# Patient Record
Sex: MALE | Race: WHITE
[De-identification: names, ages, dates, MRNs, and addresses within clinical notes are randomized per-mention and may not be internally consistent; named-entity substitution may affect disease eponyms.]

---

## 2018-10-15 ENCOUNTER — HOSPITAL ENCOUNTER (INPATIENT)
Dept: HOSPITAL 74 - JSAMEDAYSX | Age: 73
LOS: 7 days | Discharge: NURSING, REHAB OR LONG-TERM CARE | DRG: 460 | End: 2018-10-22
Attending: INTERNAL MEDICINE | Admitting: ORTHOPAEDIC SURGERY
Payer: COMMERCIAL

## 2018-10-15 VITALS — BODY MASS INDEX: 26.9 KG/M2

## 2018-10-15 DIAGNOSIS — K59.00: ICD-10-CM

## 2018-10-15 DIAGNOSIS — E78.1: ICD-10-CM

## 2018-10-15 DIAGNOSIS — M53.2X5: ICD-10-CM

## 2018-10-15 DIAGNOSIS — E83.42: ICD-10-CM

## 2018-10-15 DIAGNOSIS — M48.061: Primary | ICD-10-CM

## 2018-10-15 DIAGNOSIS — E78.5: ICD-10-CM

## 2018-10-15 DIAGNOSIS — M54.9: ICD-10-CM

## 2018-10-15 DIAGNOSIS — K21.9: ICD-10-CM

## 2018-10-15 DIAGNOSIS — D62: ICD-10-CM

## 2018-10-15 DIAGNOSIS — E83.39: ICD-10-CM

## 2018-10-15 DIAGNOSIS — F03.90: ICD-10-CM

## 2018-10-15 DIAGNOSIS — M41.86: ICD-10-CM

## 2018-10-15 PROCEDURE — 0SG30AJ FUSION OF LUMBOSACRAL JOINT WITH INTERBODY FUSION DEVICE, POSTERIOR APPROACH, ANTERIOR COLUMN, OPEN APPROACH: ICD-10-PCS | Performed by: NEUROLOGICAL SURGERY

## 2018-10-15 PROCEDURE — 0SP30JZ REMOVAL OF SYNTHETIC SUBSTITUTE FROM LUMBOSACRAL JOINT, OPEN APPROACH: ICD-10-PCS | Performed by: NEUROLOGICAL SURGERY

## 2018-10-15 PROCEDURE — 0QB30ZZ EXCISION OF LEFT PELVIC BONE, OPEN APPROACH: ICD-10-PCS | Performed by: NEUROLOGICAL SURGERY

## 2018-10-15 PROCEDURE — 4A1004G MONITORING OF CENTRAL NERVOUS ELECTRICAL ACTIVITY, INTRAOPERATIVE, OPEN APPROACH: ICD-10-PCS | Performed by: NEUROLOGICAL SURGERY

## 2018-10-15 PROCEDURE — 0RG70AJ FUSION OF 2 TO 7 THORACIC VERTEBRAL JOINTS WITH INTERBODY FUSION DEVICE, POSTERIOR APPROACH, ANTERIOR COLUMN, OPEN APPROACH: ICD-10-PCS | Performed by: ORTHOPAEDIC SURGERY

## 2018-10-15 RX ADMIN — SODIUM CHLORIDE, POTASSIUM CHLORIDE, SODIUM LACTATE AND CALCIUM CHLORIDE SCH MLS/HR: 600; 310; 30; 20 INJECTION, SOLUTION INTRAVENOUS at 22:44

## 2018-10-15 RX ADMIN — ACETAMINOPHEN SCH MG: 10 INJECTION, SOLUTION INTRAVENOUS at 16:40

## 2018-10-15 RX ADMIN — SODIUM CHLORIDE, POTASSIUM CHLORIDE, SODIUM LACTATE AND CALCIUM CHLORIDE SCH MLS: 600; 310; 30; 20 INJECTION, SOLUTION INTRAVENOUS at 17:55

## 2018-10-15 RX ADMIN — HYDROMORPHONE HYDROCHLORIDE SCH MG: 10 INJECTION, SOLUTION INTRAMUSCULAR; INTRAVENOUS; SUBCUTANEOUS at 21:38

## 2018-10-15 NOTE — PN
Progress Note (short form)





- Note


Progress Note: 





73M s/p AJITH L3-S1; L2, L3, L4 laminectomies; T6-T12 facetectomies T6-T12; L1-L2

, L2-L3 SPO's T6-S1 PISF POD #0.





-Admit to ICU post-op.


-Pain control: per anaesthesia team; recommend PCA; No NSAID's.


-DVT PPx:


   - Mechanical only: BLANE's, SCD's.


-Incentive spirometry.


-PT/OT/Rehab, OOB.


-WBAT B/L LE.


-q4h B/L LE NV checks.


-Post-op antibiotics x 2 doses.


-NPO until flatus.


-f/u AM labs.


-f/u drain output.


-Care per ICU & medical hospitalist team.


-Discharge planning: f/u 7-10 days after discharge at [facility] [place] 

office; call for appointment; [phone].


-Will follow.





Rafa Andrews MD (Orthopaedic Surgery).

## 2018-10-15 NOTE — OP
Operative Note





- Note:


Operative Date: 10/15/18


Pre-Operative Diagnosis: 1. Thoracic kypho-scoliosis.  2. Adjacent level 

disease.  3. Segmental instability


Operation: 1. L2, L3, L4 laminectomies.  2. T6-T12 facetectomies.  3. L1-L2, L2-

L3 SPO's.  4. T6-S1 PISF


Post-Operative Diagnosis: Same as Pre-op


Surgeon: Rafa Andrews


Assistant: Josue Andrews


Anesthesiologist/CRNA: Jerry Barrientos


Anesthesia: General


Specimens Removed: Hardware, bone, soft tissue


Estimated Blood Loss (mls): 400


Drains & Tubes with Location: 1 x superficial HemoVac


Blood Volume Replaced (mls): 125 (Cell saver)


Fluid Volume Replaced (mls): 3,000 (Crystalloid)


Operative Report Dictated: Yes

## 2018-10-15 NOTE — CONSULT
Consultation: 


REQUESTING PROVIDER:





CONSULT REQUEST: We have been asked to medically evaluate this patient for (

specify).





HISTORY OF PRESENT ILLNESS:





This is a 72 yo M with PMH of GERD, anemia, spinal stenosis, here POD 0 s/p L2, 

L3, L4 laminectomies; T6-T12 facetectomies; L1-L2, L2-L3 SPO's; T6-S1 PISF by 

Dr Andrews. Procedure was w/o complications; , received cell saver 125 cc 

and 3L crystalloid. Patient is currently aaox3, afebrile and hemodynamically 

stable but experiencing sever lumbar pain. He denies LE numbness or weakness. 

Denies sob, cp, cough, h/a, n/v. 





REVIEW OF SYSTEMS:


CONSTITUTIONAL: 


Absent:  fever, chills


HEENT: 


Absent:  rhinorrhea, nasal congestion, throat pain, throat swelling, difficulty 

swallowing


CARDIOVASCULAR: 


Absent: chest pain, syncope, palpitations, irregular heart rate, lightheadedness


RESPIRATORY: 


Absent: cough, shortness of breath


GASTROINTESTINAL:


Absent: abdominal pain, nausea, vomiting


GENITOURINARY: 


Absent: flank pain


MUSCULOSKELETAL: 


Absent: myalgia, arthralgia


SKIN: 


Absent: rash, itching, pallor


HEMATOLOGIC/IMMUNOLOGIC: 


Absent: easy bleeding, easy bruising


ENDOCRINE:


Absent: unexplained weight gain, unexplained weight loss


NEUROLOGIC: 


Absent: headache, focal weakness or paresthesias


PSYCHIATRIC: 


Absent: anxiety, depression





PHYSICAL EXAMINATION





 Vital Signs - 24 hr











  10/15/18 10/15/18 10/15/18





  07:03 16:01 16:15


 


Temperature 97.6 F 97.3 F L 


 


Pulse Rate 60 76 68


 


Respiratory 20 20 16





Rate   


 


Blood Pressure 136/76 116/97 120/60


 


O2 Sat by Pulse 100 100 100





Oximetry (%)   














  10/15/18 10/15/18 10/15/18





  16:30 16:45 17:00


 


Temperature   


 


Pulse Rate 70 58 L 66


 


Respiratory 16 16 16





Rate   


 


Blood Pressure 121/66 135/68 120/40 L


 


O2 Sat by Pulse 100 100 100





Oximetry (%)   














  10/15/18 10/15/18 10/15/18





  17:15 17:30 17:45


 


Temperature   


 


Pulse Rate 72 70 71


 


Respiratory 14 14 16





Rate   


 


Blood Pressure 133/56 L 134/73 143/66


 


O2 Sat by Pulse 100 100 100





Oximetry (%)   














  10/15/18 10/15/18





  18:00 18:21


 


Temperature 98.2 F 97.6 F


 


Pulse Rate 72 53 L


 


Respiratory 16 14





Rate  


 


Blood Pressure 142/68 152/79


 


O2 Sat by Pulse 100 100





Oximetry (%)  














GENERAL: Awake, alert, and fully oriented, in moderate distress due to pain 


HEAD: Normal with no signs of trauma.


EYES: Pupils equal, round and reactive to light, clera anicteric, conjunctiva 

clear. No lid lag.


EARS, NOSE, THROAT: Moist mucous membranes.


NECK: supple without lymphadenopathy, JVD, or masses.


LUNGS: Breath sounds equal, clear to auscultation bilaterally. 


HEART: Regular rate and rhythm, normal S1 and S2 


ABDOMEN: Soft, nontender, not distended, globally reduced bowel sounds, no 

guarding, no rebound, no masses. 


MUSCULOSKELETAL: No bony deformities or tenderness. 


UPPER EXTREMITIES: 2+ pulses, warm, well-perfused. No peripheral edema.


LOWER EXTREMITIES: 2+ pulses, warm, well-perfused. No calf tenderness. No 

peripheral edema. 


NEUROLOGICAL:  Cranial nerves II-XII gossly intact. Normal speech. Sensation 

intact in b/l LE, moves b/l LE but unable to test strenght due to sever back 

pain. 


PSYCHIATRIC: Cooperative. Good eye contact. Appropriate mood and affect.


SKIN: Warm, dry











 Laboratory Results - last 24 hr











  10/15/18 10/15/18





  06:22 06:55


 


Blood Type  A POSITIVE  A POSITIVE


 


Antibody Screen  Negative 








Active Medications











Generic Name Dose Route Start Last Admin





  Trade Name Freq  PRN Reason Stop Dose Admin


 


Acetaminophen  650 mg  10/15/18 16:05  





  Tylenol -  PO   





  Q4H PRN   





  PAIN LEVEL 4 - 6   





     





     





     


 


Acetaminophen  1,000 mg  10/15/18 16:15  10/15/18 16:40





  Ofirmev Injection -  IVPB  10/16/18 08:16  1,000 mg





  Q8H EVIE   Administration





     





     





     





     


 


Dicyclomine HCl  10 mg  10/16/18 10:00  





  Bentyl -  PO   





  DAILY EVIE   





     





     





     





     


 


Diphenhydramine HCl  25 mg  10/15/18 16:05  





  Benadryl Injection -  IVPUSH   





  Q4H PRN   





  Pruritis   





     





     





     


 


Donepezil HCl  5 mg  10/16/18 10:00  





  Aricept -  PO   





  DAILY EVIE   





     





     





     





     


 


Lactated Ringer's  1,000 mls @ 125 mls/hr  10/15/18 16:15  10/15/18 17:55





  Lactated Ringers Solution  IV   0 mls





  ASDIR EVIE   Administration





     





     





     





     


 


Non-Formulary Medication  1 gm  10/16/18 10:00  





  Icosapent Ethyl [Vascepa]  PO   





  DAILY Pending sale to [facility]   





     





     





     





     


 


Ondansetron HCl  4 mg  10/15/18 16:05  





  Zofran Injection  IVPUSH   





  Q4H PRN   





  NAUSEA   





     





     





     











ASSESSMENT/PLAN:


This is a 72 yo M with PMH of GERD, anemia, spinal stenosis





POD 0 s/p L2, L3, L4 laminectomies; T6-T12 facetectomies; L1-L2, L2-L3 SPO's; T6

-S1 PISF


-strict I/O, monitor drain output


-pain control with IV tylenol, Dilaudid pushes prn until anesthesia reassesses 

the patient and decides on further analgesia. 


-zofran PRN nausea


-LR @ 125


-SCD's


-incentive spirometry


-NPO until flatus 


-LE neuro checks q4h


-f/u h/h, lytes





Dispo: We will continue to follow the patient. Thank you for this consultative 

opportunity.











Problem List





- Problems


(1) Spinal stenosis


Code(s): M48.00 - SPINAL STENOSIS, SITE UNSPECIFIED   





(2) Spinal stenosis of lumbar region


Code(s): M48.061 - SPINAL STENOSIS, LUMBAR REGION WITHOUT NEUROGENIC JUN   





(3) Anemia


Code(s): D64.9 - ANEMIA, UNSPECIFIED   





(4) GERD (gastroesophageal reflux disease)


Code(s): K21.9 - GASTRO-ESOPHAGEAL REFLUX DISEASE WITHOUT ESOPHAGITIS   





(5) Back pain


Code(s): M54.9 - DORSALGIA, UNSPECIFIED   





Visit type





- Emergency Visit


Emergency Visit: No





- New Patient


This patient is new to me today: Yes


Date on this admission: 10/15/18





- Critical Care


Critical Care patient: Yes


Total Critical Care Time (in minutes): 35


Critical Care Statement: The care of this patient involved high complexity 

decision making to prevent further life threatening deterioration of the patient

's condition and/or to evaluate & treat vital organ system(s) failure or risk 

of failure.

## 2018-10-16 LAB
ANION GAP SERPL CALC-SCNC: 5 MMOL/L (ref 8–16)
BUN SERPL-MCNC: 15 MG/DL (ref 7–18)
CALCIUM SERPL-MCNC: 7.8 MG/DL (ref 8.5–10.1)
CHLORIDE SERPL-SCNC: 104 MMOL/L (ref 98–107)
CO2 SERPL-SCNC: 28 MMOL/L (ref 21–32)
CREAT SERPL-MCNC: 1 MG/DL (ref 0.55–1.3)
DEPRECATED RDW RBC AUTO: 12.6 % (ref 11.9–15.9)
GLUCOSE SERPL-MCNC: 130 MG/DL (ref 74–106)
HCT VFR BLD CALC: 30.4 % (ref 35.4–49)
HGB BLD-MCNC: 10.5 GM/DL (ref 11.7–16.9)
MAGNESIUM SERPL-MCNC: 1.7 MG/DL (ref 1.8–2.4)
MCH RBC QN AUTO: 31.4 PG (ref 25.7–33.7)
MCHC RBC AUTO-ENTMCNC: 34.5 G/DL (ref 32–35.9)
MCV RBC: 90.8 FL (ref 80–96)
PHOSPHATE SERPL-MCNC: 3.8 MG/DL (ref 2.5–4.9)
PLATELET # BLD AUTO: 155 K/MM3 (ref 134–434)
PMV BLD: 8.6 FL (ref 7.5–11.1)
POTASSIUM SERPLBLD-SCNC: 5 MMOL/L (ref 3.5–5.1)
RBC # BLD AUTO: 3.35 M/MM3 (ref 4–5.6)
SODIUM SERPL-SCNC: 138 MMOL/L (ref 136–145)
WBC # BLD AUTO: 10.5 K/MM3 (ref 4–10)

## 2018-10-16 RX ADMIN — ACETAMINOPHEN SCH MG: 10 INJECTION, SOLUTION INTRAVENOUS at 09:20

## 2018-10-16 RX ADMIN — ACETAMINOPHEN SCH MG: 10 INJECTION, SOLUTION INTRAVENOUS at 01:05

## 2018-10-16 RX ADMIN — SODIUM CHLORIDE, POTASSIUM CHLORIDE, SODIUM LACTATE AND CALCIUM CHLORIDE SCH MLS/HR: 600; 310; 30; 20 INJECTION, SOLUTION INTRAVENOUS at 17:46

## 2018-10-16 RX ADMIN — OMEGA-3-ACID ETHYL ESTERS SCH GM: 1 CAPSULE, LIQUID FILLED ORAL at 14:10

## 2018-10-16 RX ADMIN — SODIUM CHLORIDE, POTASSIUM CHLORIDE, SODIUM LACTATE AND CALCIUM CHLORIDE SCH MLS/HR: 600; 310; 30; 20 INJECTION, SOLUTION INTRAVENOUS at 06:00

## 2018-10-16 RX ADMIN — SODIUM CHLORIDE, POTASSIUM CHLORIDE, SODIUM LACTATE AND CALCIUM CHLORIDE SCH MLS/HR: 600; 310; 30; 20 INJECTION, SOLUTION INTRAVENOUS at 23:00

## 2018-10-16 RX ADMIN — DONEPEZIL HYDROCHLORIDE SCH: 5 TABLET, FILM COATED ORAL at 17:46

## 2018-10-16 NOTE — PN
Progress Note (short form)





- Note


Progress Note: 





POD #1 - s/p T6-S1 laminectomy revision/fusion under GA with dilaudid PCA for 

post-op pain management.


VSS. Overall, pt. doing well, resting in bed awaiting physical therapy. Pt. 

encouraged to continue to use


PCA as directed. No apparent anesthetic complications noted. Continue current 

care.

## 2018-10-16 NOTE — PN
Teaching Attending Note


Name of Resident: Josue Cortes





ATTENDING PHYSICIAN STATEMENT





I saw and evaluated the patient.


I reviewed the resident's note and discussed the case with the resident.


I agree with the resident's findings and plan as documented.








SUBJECTIVE:





Pt seen and examined in the ICU. Pain relatively controlled with current 

regimen. No flatus or BM yet. 





OBJECTIVE:





 Vital Signs











 Period  Temp  Pulse  Resp  BP Sys/Calloway  Pulse Ox


 


 Last 24 Hr  97.3 F-98.2 F  53-76  10-22  102-152/40-97  100-100








 Intake & Output











 10/13/18 10/14/18 10/15/18 10/16/18





 23:59 23:59 23:59 23:59


 


Intake Total   3650 1600


 


Output Total   720 1260


 


Balance   2930 340


 


Weight    79.605 kg








Gen:  NAD at rest


Heart: RRR


Lung: decreased breath sounds at the bases


Abd: soft, nontender


Ext: no edema


Drain with serosanguinous fluid





 CBC, BMP





 10/16/18 05:30 





 10/16/18 05:30 





Active Medications





Acetaminophen (Tylenol -)  650 mg PO Q4H PRN


   PRN Reason: PAIN LEVEL 4 - 6


Diphenhydramine HCl (Benadryl Injection -)  25 mg IVPUSH Q4H PRN


   PRN Reason: Pruritis


Donepezil HCl (Aricept -)  5 mg PO DAILY EVIE


Hydromorphone HCl (Dilaudid Pca -)  10 mg PCA PCA EVIE; Protocol


   Stop: 10/22/18 20:24


   Last Admin: 10/15/18 21:38 Dose:  10 mg


Lactated Ringer's (Lactated Ringers Solution)  1,000 mls @ 125 mls/hr IV ASDIR 

EVIE


   Last Admin: 10/16/18 06:00 Dose:  125 mls/hr


Magnesium Oxide (Mag-Ox -)  400 mg PO DAILY EVIE


Omega-3-Acid Ethyl Esters (Lovaza -)  2 gm PO DAILY EVIE


Ondansetron HCl (Zofran Injection)  4 mg IVPUSH Q4H PRN


   PRN Reason: NAUSEA


Ondansetron HCl (Zofran Injection)  4 mg IVPUSH Q4H PRN


   PRN Reason: NAUSEA AND/OR VOMITING


Promethazine HCl (Phenergan Injection -)  12.5 mg IVPB Q6H PRN


   PRN Reason: NAUSEA AND/OR VOMITING








ASSESSMENT AND PLAN:


Thoracic Kypho-scoliosis


s/p L2, L3, L4 laminectomies/T6-T12 Facetectomies/L1-L2, L2-L3 osteotomies/T6-

S1 PISF





-  pain control


-  incentive spirometry


-  monitor drain output


-  PO when flatus


-  d/c espinoza when OOB


-  DVT prophylaxis/disposition per surgery

## 2018-10-16 NOTE — PN
Physical Exam: 


SUBJECTIVE: Patient seen and examined at bedside


complains of lower back pain


denies having flatus or BM


Denies nausea vomiting fever chills chest pain or shortness of breath.








OBJECTIVE:





 Vital Signs











 Period  Temp  Pulse  Resp  BP Sys/Calloway  Pulse Ox


 


 Last 24 Hr  97.3 F-98.2 F  53-76  10-22  102-152/40-97  100-100











GENERAL: The patient is awake, alert, and fully oriented, in no acute distress.


HEAD: Normal with no signs of trauma.


EYES: PERRL, extraocular movements intact


ENT: Dry mucous membranes.


NECK: Trachea midline, full range of motion, supple. 


LUNGS: Breath sounds equal, clear to auscultation bilaterally, no wheezes, no 

crackles, no 


accessory muscle use. 


HEART: Regular rate and rhythm, S1, S2 without murmur, rub or gallop.


ABDOMEN: Soft, nontender, nondistended, hypoactive bowel sounds, no guarding, 

no 


rebound, no hepatosplenomegaly, no masses.


EXTREMITIES: pulses, warm, well-perfused, no edema. 


NEUROLOGICAL: Cranial nerves II through XII grossly intact. Sensation intact. 

Normal speech. Patient refusing to participate in neuro exam to check musle 

strength and ROM due to fear of pain. Drain with serosangenous output (more 

sangenous)


PSYCH: Normal mood, normal affect.


SKIN: Warm, dry














 Laboratory Results - last 24 hr











  10/15/18 10/16/18 10/16/18





  06:55 05:30 05:30


 


WBC   10.5 H 


 


RBC   3.35 L 


 


Hgb   10.5 L 


 


Hct   30.4 L 


 


MCV   90.8 


 


MCH   31.4 


 


MCHC   34.5 


 


RDW   12.6 


 


Plt Count   155 


 


MPV   8.6 


 


Sodium    138


 


Potassium    5.0


 


Chloride    104


 


Carbon Dioxide    28


 


Anion Gap    5 L


 


BUN    15


 


Creatinine    1.0


 


Creat Clearance w eGFR    > 60


 


Random Glucose    130 H


 


Calcium    7.8 L


 


Phosphorus    3.8


 


Magnesium    1.7 L


 


Blood Type  A POSITIVE  








Active Medications











Generic Name Dose Route Start Last Admin





  Trade Name Freq  PRN Reason Stop Dose Admin


 


Acetaminophen  650 mg  10/15/18 16:05  





  Tylenol -  PO   





  Q4H PRN   





  PAIN LEVEL 4 - 6   





     





     





     


 


Dicyclomine HCl  10 mg  10/16/18 10:00  





  Bentyl -  PO   





  DAILY EVIE   





     





     





     





     


 


Diphenhydramine HCl  25 mg  10/15/18 16:05  





  Benadryl Injection -  IVPUSH   





  Q4H PRN   





  Pruritis   





     





     





     


 


Donepezil HCl  5 mg  10/16/18 10:00  





  Aricept -  PO   





  DAILY EVIE   





     





     





     





     


 


Hydromorphone HCl  10 mg  10/15/18 20:30  10/15/18 21:38





  Dilaudid Pca -  PCA  10/22/18 20:24  10 mg





  PCA EVIE   Administration





     





     





  Protocol   





     


 


Lactated Ringer's  1,000 mls @ 125 mls/hr  10/15/18 16:15  10/16/18 06:00





  Lactated Ringers Solution  IV   125 mls/hr





  ASDIR EVIE   Administration





     





     





     





     


 


Magnesium Sulfate  2 gm  10/16/18 07:41  





  Magnesium Sulfate  IVPB  10/16/18 07:42  





  ONCE ONE   





     





     





     





     


 


Non-Formulary Medication  1 gm  10/16/18 10:00  





  Icosapent Ethyl [Vascepa]  PO   





  DAILY EVIE   





     





     





     





     


 


Ondansetron HCl  4 mg  10/15/18 16:05  





  Zofran Injection  IVPUSH   





  Q4H PRN   





  NAUSEA   





     





     





     


 


Ondansetron HCl  4 mg  10/15/18 20:20  





  Zofran Injection  IVPUSH   





  Q4H PRN   





  NAUSEA AND/OR VOMITING   





     





     





     


 


Promethazine HCl  12.5 mg  10/15/18 20:20  





  Phenergan Injection -  IVPB   





  Q6H PRN   





  NAUSEA AND/OR VOMITING   





     





     





     











ASSESSMENT/PLAN:


73M with history of anemia, hypertriglyceridemia,  GERD,  and Thoracic kypho-

scoliosis/Adjacent level disease/Segmental instability s/p L2, L3, L4 

laminectomies, T6-T12 facetectomies, L1-L2, L2-L3 SPO's and T6-S1 PISF.





Thoracic hyphoscoliosis:


continue PCA for pain control


antiemetics


monitor drain output


NPO until flatus per surgery


continue IVF


physical therapy





Hypertriglyeridemia:


Continue Vascepa





Anemia:


Trend CBC


stable 





Early demetia:


Continue aricept





questionable history of IBS: Patient states he has not been taking bntyl for 

atleast the last week. He dneos not believe he has IBS. He believes it is 

referred pain from his back so he stopped taking it to see where the pain is 

coming from.


He does not want to take it right now


Will hold


Follow up with GI as outpatient





FEN:


LR @ 125ml/hr


hypomagnesemia: Chronic will give magnesium sulfate 2gm IV and restart home 

medication of magnesium


NPO until passes flatus





PPx:


TEDs no chemical PPx per surger. No aspirin per surgery 


PT consult





Case discussed with Dr. Hines 











Visit type





- Emergency Visit


Emergency Visit: No





- New Patient


This patient is new to me today: Yes


Date on this admission: 10/16/18





- Critical Care


Critical Care patient: Yes


Total Critical Care Time (in minutes): 40


Critical Care Statement: The care of this patient involved high complexity 

decision making to prevent further life threatening deterioration of the patient

's condition and/or to evaluate & treat vital organ system(s) failure or risk 

of failure.





- Discharge Referral


Referred to [facility] No

## 2018-10-16 NOTE — PN
Physical Exam: 


SUBJECTIVE: Patient seen and examined at bedside. Complains of pain at surgical 

site, otherwise comfortable. Good urine output through espinoza. No flatus/BMs as 

yet.








OBJECTIVE:





 Vital Signs











 Period  Temp  Pulse  Resp  BP Sys/Calloway  Pulse Ox


 


 Last 24 Hr  97.3 F-98.2 F  53-76  10-22  102-152/40-97  100-100











GENERAL: A&Ox3


HEAD: NC/AT


EYES: PERRLA, EOMI, sclera anicteric, conjunctiva clear. No ptosis. 


ENT: Ears normal, nares patent, oropharynx clear without exudates, moist mucous 


membranes.


NECK: Trachea midline, full range of motion, supple. 


LUNGS: Breath sounds equal, clear to auscultation bilaterally, no wheezes, no 

crackles, no 


accessory muscle use. 


HEART: Regular rate and rhythm, S1, S2 without murmur, rub or gallop.


ABDOMEN: Soft, nontender, nondistended, normoactive bowel sounds, no guarding, 

no 


rebound, no hepatosplenomegaly, no masses.


EXTREMITIES: 2+ pulses, warm, well-perfused, no edema. 


NEUROLOGICAL: CNs intact b/l, sensation intact throughout, motor exam limited 

by patient discomfort but no focal deficits appreciated


PSYCH: Normal mood, normal affect.


SKIN: Warm, dry, normal turgor, no rashes or lesions noted


DRAINS: MYKE drain 110 ml 














 Laboratory Results - last 24 hr











  10/16/18 10/16/18





  05:30 05:30


 


WBC  10.5 H 


 


RBC  3.35 L 


 


Hgb  10.5 L 


 


Hct  30.4 L 


 


MCV  90.8 


 


MCH  31.4 


 


MCHC  34.5 


 


RDW  12.6 


 


Plt Count  155 


 


MPV  8.6 


 


Sodium   138


 


Potassium   5.0


 


Chloride   104


 


Carbon Dioxide   28


 


Anion Gap   5 L


 


BUN   15


 


Creatinine   1.0


 


Creat Clearance w eGFR   > 60


 


Random Glucose   130 H


 


Calcium   7.8 L


 


Phosphorus   3.8


 


Magnesium   1.7 L








Active Medications











Generic Name Dose Route Start Last Admin





  Trade Name Freq  PRN Reason Stop Dose Admin


 


Acetaminophen  650 mg  10/15/18 16:05  





  Tylenol -  PO   





  Q4H PRN   





  PAIN LEVEL 4 - 6   





     





     





     


 


Diphenhydramine HCl  25 mg  10/15/18 16:05  





  Benadryl Injection -  IVPUSH   





  Q4H PRN   





  Pruritis   





     





     





     


 


Donepezil HCl  5 mg  10/16/18 10:00  





  Aricept -  PO   





  DAILY EVIE   





     





     





     





     


 


Hydromorphone HCl  10 mg  10/15/18 20:30  10/15/18 21:38





  Dilaudid Pca -  PCA  10/22/18 20:24  10 mg





  PCA EVIE   Administration





     





     





  Protocol   





     


 


Lactated Ringer's  1,000 mls @ 125 mls/hr  10/15/18 16:15  10/16/18 06:00





  Lactated Ringers Solution  IV   125 mls/hr





  ASDIR EVIE   Administration





     





     





     





     


 


Magnesium Oxide  400 mg  10/17/18 10:00  





  Mag-Ox -  PO   





  DAILY EVIE   





     





     





     





     


 


Non-Formulary Medication  1 gm  10/16/18 10:00  





  Icosapent Ethyl [Vascepa]  PO   





  DAILY EVIE   





     





     





     





     


 


Ondansetron HCl  4 mg  10/15/18 16:05  





  Zofran Injection  IVPUSH   





  Q4H PRN   





  NAUSEA   





     





     





     


 


Ondansetron HCl  4 mg  10/15/18 20:20  





  Zofran Injection  IVPUSH   





  Q4H PRN   





  NAUSEA AND/OR VOMITING   





     





     





     


 


Promethazine HCl  12.5 mg  10/15/18 20:20  





  Phenergan Injection -  IVPB   





  Q6H PRN   





  NAUSEA AND/OR VOMITING   





     





     





     











ASSESSMENT/PLAN:





72 y/o M w/ PMHx anemia, hypertriglyceridemia, GERD, thoracic kypho-scoliosis/

Adjacent level disease/Segmental instability s/p L2, L3, L4 laminectomies, T6-

T12 facetectomies, L1-L2, L2-L3 SPO's and T6-S1 PISF.





#Thoracic kyphoscoliosis s/p extensive spinal Sx as above


-cont dilaudid PCA


-no NSAIDs


-zofran, compazine PRN


-monitor drain output


-NPO until flatus


-D5LR @ 125


-PT/OT


-neuro checks q4h


-incentive spirometry





#anemia


-stable


-monitor CBC





#FEN:


-LR @ 125


-monitor, replete as necessary


-NPO until passes flatus, then advance as tolerated





#PPx:


-DVT: SCDs/TEDs no pharmacologic AC per neuro


-GI: not indicated at this time





#dispo


-monitor in ICU











Visit type





- Emergency Visit


Emergency Visit: No





- New Patient


This patient is new to me today: Yes


Date on this admission: 10/16/18





- Critical Care


Critical Care patient: Yes


Total Critical Care Time (in minutes): 40


Critical Care Statement: The care of this patient involved high complexity 

decision making to prevent further life threatening deterioration of the patient

's condition and/or to evaluate & treat vital organ system(s) failure or risk 

of failure.

## 2018-10-16 NOTE — PN
Teaching Attending Note


Name of Resident: Ananth Zaman





ATTENDING PHYSICIAN STATEMENT





I saw and evaluated the patient.


I reviewed the resident's note and discussed the case with the resident.


I agree with the resident's findings and plan as documented.








SUBJECTIVE: Patient complains of back pain. He gets relief with Dilaudid PCA. 

He has not had bowel movement or flatus since surgery. He denies abdominal pain 

and nausea. His mouth is dry. 








OBJECTIVE:


 Vital Signs











 Period  Temp  Pulse  Resp  BP Sys/Calloway  Pulse Ox


 


 Last 24 Hr  97.3 F-98.2 F  53-76  10-22  102-152/40-97  100-100








HEART: S1S2, RRR


LUNGS: Clear


ABDOMEN: Soft, non-tender, non-distended, hypoactive BS


EXTREMITIES: No edema





 Laboratory Results - last 24 hr











  10/16/18 10/16/18





  05:30 05:30


 


WBC  10.5 H 


 


RBC  3.35 L 


 


Hgb  10.5 L 


 


Hct  30.4 L 


 


MCV  90.8 


 


MCH  31.4 


 


MCHC  34.5 


 


RDW  12.6 


 


Plt Count  155 


 


MPV  8.6 


 


Sodium   138


 


Potassium   5.0


 


Chloride   104


 


Carbon Dioxide   28


 


Anion Gap   5 L


 


BUN   15


 


Creatinine   1.0


 


Creat Clearance w eGFR   > 60


 


Random Glucose   130 H


 


Calcium   7.8 L


 


Phosphorus   3.8


 


Magnesium   1.7 L








 Current Medications











Generic Name Dose Route Start Last Admin





  Trade Name Freq  PRN Reason Stop Dose Admin


 


Acetaminophen  650 mg  10/15/18 16:05  





  Tylenol -  PO   





  Q4H PRN   





  PAIN LEVEL 4 - 6   





     





     





     


 


Diphenhydramine HCl  25 mg  10/15/18 16:05  





  Benadryl Injection -  IVPUSH   





  Q4H PRN   





  Pruritis   





     





     





     


 


Donepezil HCl  5 mg  10/16/18 10:00  





  Aricept -  PO   





  DAILY EVIE   





     





     





     





     


 


Hydromorphone HCl  10 mg  10/15/18 20:30  10/15/18 21:38





  Dilaudid Pca -  PCA  10/22/18 20:24  10 mg





  PCA EVIE   Administration





     





     





  Protocol   





     


 


Lactated Ringer's  1,000 mls @ 125 mls/hr  10/15/18 16:15  10/16/18 06:00





  Lactated Ringers Solution  IV   125 mls/hr





  ASDIR EVIE   Administration





     





     





     





     


 


Magnesium Oxide  400 mg  10/17/18 10:00  





  Mag-Ox -  PO   





  DAILY EVIE   





     





     





     





     


 


Omega-3-Acid Ethyl Esters  2 gm  10/16/18 10:35  





  Lovaza -  PO   





  DAILY EVIE   





     





     





     





     


 


Ondansetron HCl  4 mg  10/15/18 16:05  





  Zofran Injection  IVPUSH   





  Q4H PRN   





  NAUSEA   





     





     





     


 


Ondansetron HCl  4 mg  10/15/18 20:20  





  Zofran Injection  IVPUSH   





  Q4H PRN   





  NAUSEA AND/OR VOMITING   





     





     





     


 


Promethazine HCl  12.5 mg  10/15/18 20:20  





  Phenergan Injection -  IVPB   





  Q6H PRN   





  NAUSEA AND/OR VOMITING   





     





     





     














ASSESSMENT AND PLAN:


This is a 73 year old man with a history of kyphoscoliosis, hypertriglyceridemia

, hypomagnesemia, anemia, GERD who was admitted for spine surgery.





1. Thoracic kyphoscoliosis with adjacent level disease and segmental instability


   - s/p L2, L3, L4 laminectomies; T6-T12 facetectomies; L1-L2, L2-L3 SPOs; T6-

S1 PISF 10/15


   - PT evaluation


   - Incentive spirometer


   - Continue NPO until flatus


   - Continue IV fluid


2. Hypertriglyceridemia


   - Continue Lovaza


3. Hypomagnesemia


   - Continue magnesium supplementation


4. Anemia


   - Monitor hgb


5. GERD

## 2018-10-17 LAB
ANION GAP SERPL CALC-SCNC: 7 MMOL/L (ref 8–16)
BUN SERPL-MCNC: 15 MG/DL (ref 7–18)
CALCIUM SERPL-MCNC: 8.4 MG/DL (ref 8.5–10.1)
CHLORIDE SERPL-SCNC: 102 MMOL/L (ref 98–107)
CO2 SERPL-SCNC: 28 MMOL/L (ref 21–32)
CREAT SERPL-MCNC: 0.9 MG/DL (ref 0.55–1.3)
DEPRECATED RDW RBC AUTO: 12.9 % (ref 11.9–15.9)
GLUCOSE SERPL-MCNC: 100 MG/DL (ref 74–106)
HCT VFR BLD CALC: 30.2 % (ref 35.4–49)
HGB BLD-MCNC: 10.3 GM/DL (ref 11.7–16.9)
MAGNESIUM SERPL-MCNC: 2.1 MG/DL (ref 1.8–2.4)
MCH RBC QN AUTO: 31.1 PG (ref 25.7–33.7)
MCHC RBC AUTO-ENTMCNC: 34 G/DL (ref 32–35.9)
MCV RBC: 91.4 FL (ref 80–96)
PHOSPHATE SERPL-MCNC: 2.8 MG/DL (ref 2.5–4.9)
PLATELET # BLD AUTO: 178 K/MM3 (ref 134–434)
PMV BLD: 8.5 FL (ref 7.5–11.1)
POTASSIUM SERPLBLD-SCNC: 4.4 MMOL/L (ref 3.5–5.1)
RBC # BLD AUTO: 3.31 M/MM3 (ref 4–5.6)
SODIUM SERPL-SCNC: 137 MMOL/L (ref 136–145)
WBC # BLD AUTO: 10 K/MM3 (ref 4–10)

## 2018-10-17 RX ADMIN — SODIUM CHLORIDE, POTASSIUM CHLORIDE, SODIUM LACTATE AND CALCIUM CHLORIDE SCH MLS/HR: 600; 310; 30; 20 INJECTION, SOLUTION INTRAVENOUS at 16:00

## 2018-10-17 RX ADMIN — HYDROMORPHONE HYDROCHLORIDE SCH: 10 INJECTION, SOLUTION INTRAMUSCULAR; INTRAVENOUS; SUBCUTANEOUS at 21:18

## 2018-10-17 RX ADMIN — HYDROMORPHONE HYDROCHLORIDE SCH MG: 10 INJECTION, SOLUTION INTRAMUSCULAR; INTRAVENOUS; SUBCUTANEOUS at 05:51

## 2018-10-17 RX ADMIN — DONEPEZIL HYDROCHLORIDE SCH MG: 5 TABLET, FILM COATED ORAL at 10:20

## 2018-10-17 RX ADMIN — SODIUM CHLORIDE, POTASSIUM CHLORIDE, SODIUM LACTATE AND CALCIUM CHLORIDE SCH MLS/HR: 600; 310; 30; 20 INJECTION, SOLUTION INTRAVENOUS at 06:59

## 2018-10-17 RX ADMIN — MAGNESIUM OXIDE TAB 400 MG (241.3 MG ELEMENTAL MG) SCH MG: 400 (241.3 MG) TAB at 10:20

## 2018-10-17 RX ADMIN — OMEGA-3-ACID ETHYL ESTERS SCH GM: 1 CAPSULE, LIQUID FILLED ORAL at 10:20

## 2018-10-17 RX ADMIN — HYDROMORPHONE HYDROCHLORIDE SCH: 10 INJECTION, SOLUTION INTRAMUSCULAR; INTRAVENOUS; SUBCUTANEOUS at 00:16

## 2018-10-17 NOTE — PN
Teaching Attending Note


Name of Resident: Cruz Harmon





ATTENDING PHYSICIAN STATEMENT





I saw and evaluated the patient.


I reviewed the resident's note and discussed the case with the resident.


I agree with the resident's findings and plan as documented with exceptions 

below.








SUBJECTIVE:


Patient seen and examined, attempting to void. Has not passed gas, no new 

complaints. Pain well controlled.





OBJECTIVE:


 Vital Signs











 Period  Temp  Pulse  Resp  BP Sys/Calloway  Pulse Ox


 


 Last 24 Hr  98.2 F-98.6 F  64-93  10-29  113-155/46-82  98-98








 Intake & Output











 10/14/18 10/15/18 10/16/18 10/17/18





 23:59 23:59 23:59 23:59


 


Intake Total  3650 3240 1433


 


Output Total  720 2990 1160


 


Balance  2930 250 273


 


Weight   175 lb 8 oz 177 lb








General: standing next to bed, no acute distress


Musculoskeletal: spinal dressing clean with drain present


Chest: CTAB, no rales or wheezing


Abdomen:Soft, mild distension, positive bowel sounds, no voluntary or 

involuntary guarding or rigidity, no suprapubic or CVA tenderness


Extremities: no edema





 Home Medications











 Medication  Instructions  Recorded


 


Ascorbic Acid [Vitamin C] 1,000 mg PO DAILY 10/11/18


 


Aspirin [ASA -] 81 mg PO DAILY 10/11/18


 


Cholecalciferol (Vitamin D3) 1,000 unit PO DAILY 10/11/18





[Vitamin D3 -]  


 


Coconut Oil 1,000 mg PO DAILY 10/11/18


 


Dicyclomine HCl 10 mg PO DAILY 10/11/18


 


Donepezil HCl [Aricept] 5 mg PO DAILY 10/11/18


 


Icosapent Ethyl [Vascepa] 1 gm PO DAILY 10/11/18


 


Magnesium 400 mg PO DAILY 10/11/18


 


Acetaminophen [Tylenol] 325 mg PO PRN PRN 10/15/18








Active Medications





Acetaminophen (Tylenol -)  650 mg PO Q4H PRN


   PRN Reason: PAIN LEVEL 4 - 6


Diphenhydramine HCl (Benadryl Injection -)  25 mg IVPUSH Q4H PRN


   PRN Reason: Pruritis


Donepezil HCl (Aricept -)  5 mg PO DAILY EVIE


   Last Admin: 10/17/18 10:20 Dose:  5 mg


Hydromorphone HCl (Dilaudid Pca -)  10 mg PCA PCA EVIE; Protocol


   Stop: 10/22/18 20:24


   Last Admin: 10/17/18 05:51 Dose:  10 mg


Lactated Ringer's (Lactated Ringers Solution)  1,000 mls @ 125 mls/hr IV ASDIR 

[facility]


   Last Admin: 10/17/18 06:59 Dose:  125 mls/hr


Magnesium Oxide (Mag-Ox -)  400 mg PO DAILY [facility]


   Last Admin: 10/17/18 10:20 Dose:  400 mg


Omega-3-Acid Ethyl Esters (Lovaza -)  2 gm PO DAILY [facility]


   Last Admin: 10/17/18 10:20 Dose:  2 gm


Ondansetron HCl (Zofran Injection)  4 mg IVPUSH Q4H PRN


   PRN Reason: NAUSEA


Ondansetron HCl (Zofran Injection)  4 mg IVPUSH Q4H PRN


   PRN Reason: NAUSEA AND/OR VOMITING


Promethazine HCl (Phenergan Injection -)  12.5 mg IVPB Q6H PRN


   PRN Reason: NAUSEA AND/OR VOMITING





 Laboratory Results - last 24 hr











  10/17/18 10/17/18





  05:30 05:30


 


WBC  10.0 


 


RBC  3.31 L 


 


Hgb  10.3 L 


 


Hct  30.2 L 


 


MCV  91.4 


 


MCH  31.1 


 


MCHC  34.0 


 


RDW  12.9 


 


Plt Count  178 


 


MPV  8.5 


 


Sodium   137


 


Potassium   4.4


 


Chloride   102


 


Carbon Dioxide   28


 


Anion Gap   7 L


 


BUN   15


 


Creatinine   0.9


 


Creat Clearance w eGFR   > 60


 


Random Glucose   100


 


Calcium   8.4 L


 


Phosphorus   2.8


 


Magnesium   2.1














ASSESSMENT AND PLAN:


73 year old man with a history of kyphoscoliosis, hypertriglyceridemia, 

hypomagnesemia, anemia, GERD who was admitted for spine surgery.





- Thoracic kyphoscoliosis with adjacent level disease and segmental instability 

s/p L2, L3, L4 laminectomies; T6-T12 facetectomies; L1-L2, L2-L3 SPOs; T6-S1 

PISF 10/15


-Hypertriglyceridemia


-Hypomagnesemia


-Anemia


-GERD





Plan:


Doing well,


PCA. 


Advance po once passing gas, d/c espinoza in 24 hours


Encourage PT eval and ambulation


Incentive spirometry


Replete lytes prn


monitor h/h 


continue lovaza


DVTPPX/dispo per spine surgery. 


Plan discussed with patient in detail, all questions answered.

## 2018-10-17 NOTE — PN
Physical Exam: 


SUBJECTIVE: Patient seen and examined at bedside. Complains of pain at surgical 

site, otherwise comfortable. Good urine output through espinoza. No flatus/BMs as 

yet.





OBJECTIVE:





 Vital Signs











 Period  Temp  Pulse  Resp  BP Sys/Calloway  Pulse Ox


 


 Last 24 Hr  98.2 F-98.6 F  64-80  10-20  103-131/52-82  98











GENERAL: A&Ox3


HEAD: NC/AT


EYES: PERRLA, EOMI, sclera anicteric, conjunctiva clear. No ptosis. 


ENT: Ears normal, nares patent, oropharynx clear without exudates, moist mucous 


membranes.


NECK: Trachea midline, full range of motion, supple. 


LUNGS: Breath sounds equal, clear to auscultation bilaterally, no wheezes, no 

crackles, no 


accessory muscle use. 


HEART: Regular rate and rhythm, S1, S2 without murmur, rub or gallop.


ABDOMEN: Soft, nontender, nondistended, normoactive bowel sounds, no guarding, 

no 


rebound, no hepatosplenomegaly, no masses.


EXTREMITIES: 2+ pulses, warm, well-perfused, no edema. 


NEUROLOGICAL: CNs intact b/l, sensation intact throughout, motor exam limited 

by patient discomfort but no focal deficits appreciated


PSYCH: Normal mood, normal affect.


SKIN: Warm, dry, normal turgor, no rashes or lesions noted


DRAINS: vac drain 290 ml yesterday














 Laboratory Results - last 24 hr











  10/17/18 10/17/18





  05:30 05:30


 


WBC  10.0 


 


RBC  3.31 L 


 


Hgb  10.3 L 


 


Hct  30.2 L 


 


MCV  91.4 


 


MCH  31.1 


 


MCHC  34.0 


 


RDW  12.9 


 


Plt Count  178 


 


MPV  8.5 


 


Sodium   137


 


Potassium   4.4


 


Chloride   102


 


Carbon Dioxide   28


 


Anion Gap   7 L


 


BUN   15


 


Creatinine   0.9


 


Creat Clearance w eGFR   > 60


 


Random Glucose   100


 


Calcium   8.4 L


 


Phosphorus   2.8


 


Magnesium   2.1








Active Medications











Generic Name Dose Route Start Last Admin





  Trade Name Freq  PRN Reason Stop Dose Admin


 


Acetaminophen  650 mg  10/15/18 16:05  





  Tylenol -  PO   





  Q4H PRN   





  PAIN LEVEL 4 - 6   





     





     





     


 


Diphenhydramine HCl  25 mg  10/15/18 16:05  





  Benadryl Injection -  IVPUSH   





  Q4H PRN   





  Pruritis   





     





     





     


 


Donepezil HCl  5 mg  10/16/18 10:00  10/17/18 10:20





  Aricept -  PO   5 mg





  DAILY EVIE   Administration





     





     





     





     


 


Hydromorphone HCl  10 mg  10/15/18 20:30  10/17/18 05:51





  Dilaudid Pca -  PCA  10/22/18 20:24  10 mg





  PCA EVIE   Administration





     





     





  Protocol   





     


 


Lactated Ringer's  1,000 mls @ 125 mls/hr  10/15/18 16:15  10/17/18 06:59





  Lactated Ringers Solution  IV   125 mls/hr





  ASDIR EVIE   Administration





     





     





     





     


 


Magnesium Oxide  400 mg  10/17/18 10:00  10/17/18 10:20





  Mag-Ox -  PO   400 mg





  DAILY EVIE   Administration





     





     





     





     


 


Omega-3-Acid Ethyl Esters  2 gm  10/16/18 11:00  10/17/18 10:20





  Lovaza -  PO   2 gm





  DAILY EVIE   Administration





     





     





     





     


 


Ondansetron HCl  4 mg  10/15/18 16:05  





  Zofran Injection  IVPUSH   





  Q4H PRN   





  NAUSEA   





     





     





     


 


Ondansetron HCl  4 mg  10/15/18 20:20  





  Zofran Injection  IVPUSH   





  Q4H PRN   





  NAUSEA AND/OR VOMITING   





     





     





     


 


Promethazine HCl  12.5 mg  10/15/18 20:20  





  Phenergan Injection -  IVPB   





  Q6H PRN   





  NAUSEA AND/OR VOMITING   





     





     





     











ASSESSMENT/PLAN:





74 y/o M w/ PMHx anemia, hypertriglyceridemia, GERD, thoracic kypho-scoliosis/

Adjacent level disease/Segmental instability s/p L2, L3, L4 laminectomies, T6-

T12 facetectomies, L1-L2, L2-L3 SPO's and T6-S1 PISF.





#Thoracic kyphoscoliosis s/p extensive spinal Sx as above


-cont dilaudid PCA


-no NSAIDs


-zofran, compazine PRN


-monitor drain output


-NPO until flatus


-LR @ 125


-PT/OT


-neuro checks q4h


-incentive spirometry





#anemia


-stable


-monitor CBC





#FEN:


-LR @ 125


-monitor, replete as necessary


-NPO until passes flatus, then advance as tolerated





#PPx:


-DVT: SCDs/TEDs no pharmacologic AC per neuroSx


-GI: not indicated at this time





#dispo


-monitor in ICU











Visit type





- Emergency Visit


Emergency Visit: No





- New Patient


This patient is new to me today: No





- Critical Care


Critical Care patient: Yes


Total Critical Care Time (in minutes): 40


Critical Care Statement: The care of this patient involved high complexity 

decision making to prevent further life threatening deterioration of the patient

's condition and/or to evaluate & treat vital organ system(s) failure or risk 

of failure.

## 2018-10-17 NOTE — PATH
Surgical Pathology Report



Patient Name:  LOUISE GIL

Accession #:  [id number]

[facility]. Rec. #:  [id number]                                                        

   /Age/Gender:  1945 (Age: 73) / M

Account:  [id number]                                                          

             Location: [facility]

Taken:  10/15/2018

Received:  10/16/2018

Reported:  10/17/2018

Physicians:  Rafa Andrews M.D.

  



Specimen(s) Received

 HARDWARE 





Clinical History

Spinal stenosis







Final Diagnosis

LUMBAR HARDWARE, REMOVAL:

SURGICAL HARDWARE. MACROSCOPIC DIAGNOSIS.





***Electronically Signed***

Nica Lowe M.D.





Gross Description

Received fresh labeled "lumbar hardware," are 8 silver metallic screws averaging

5.5 cm in length. There are 8 smaller silver metallic screws averaging 0.4 cm in

length. There is a 5.5 cm in greatest dimension yellow metallic surgical device

present as well as 2 silver metallic bent rods measuring 9.8 and 10.3 cm in

length. No soft tissue is present. No sections are submitted, gross only.

DL/10/16/2018



saudi/10/16/2018

## 2018-10-17 NOTE — PN
Physical Exam: 


SUBJECTIVE: Patient seen and examined at bedside. He endorses some pain, 

controlled by dilaudid PCA. Urinating clear light yellow urine into espinoza 

catheter. Wound vac draining well. Has not passed flatus or bowel movement. 

Denies fevers, chills, shortness of breath, chest pain, palpitations, abdominal 

pain, nausea, vomiting. 








OBJECTIVE:





 Vital Signs











 Period  Temp  Pulse  Resp  BP Sys/Calloway  Pulse Ox


 


 Last 24 Hr  98.2 F-98.6 F  64-93  10-29  113-155/46-82  98-98











GENERAL: The patient is awake, alert, and fully oriented, in no acute distress.


HEAD: Normal with no signs of trauma.


EYES: PERRLA, extraocular movements intact without nystagmus, sclera anicteric, 

conjunctiva clear. 


ENT: Oropharynx clear without exudates, moist mucous membranes.


NECK: Trachea midline, full range of motion, supple without lymphadenopathy


LUNGS: Good inspiratory effort and air entry b/l. Breath sounds equal, clear to 

auscultation bilaterally. No wheezes, no crackles. No accessory muscle use. 


HEART: Regular rate and rhythm, S1, S2 without murmur, rub or gallop.


ABDOMEN: Soft, nontender, nondistended, normoactive bowel sounds. No guarding, 

no rebound tenderness. No hepatosplenomegaly appreciated.


EXTREMITIES: 2+ radial and dorsalis pedis pulses b/l. Warm, well-perfused. No 

lower extremity edema b/l. 


NEUROLOGICAL: Cranial nerves II through XII grossly intact. 2+ biceps, 

brachioradialis, and patellar reflexes b/l. Strength 4/5 b/l upper extremities 

in flexion, extension, abduction, adduction, and lower extremities in hip 

flexion, extension, knee flexion, extension, plantarflexion, dorsiflexion.


PSYCH: Normal mood, normal affect upon my encounter today.


SKIN: Warm, dry. Surgical site along spine bandaged, clean, not bleeding, no 

discharge.











 Laboratory Results - last 24 hr











  10/17/18 10/17/18





  05:30 05:30


 


WBC  10.0 


 


RBC  3.31 L 


 


Hgb  10.3 L 


 


Hct  30.2 L 


 


MCV  91.4 


 


MCH  31.1 


 


MCHC  34.0 


 


RDW  12.9 


 


Plt Count  178 


 


MPV  8.5 


 


Sodium   137


 


Potassium   4.4


 


Chloride   102


 


Carbon Dioxide   28


 


Anion Gap   7 L


 


BUN   15


 


Creatinine   0.9


 


Creat Clearance w eGFR   > 60


 


Random Glucose   100


 


Calcium   8.4 L


 


Phosphorus   2.8


 


Magnesium   2.1








Active Medications











Generic Name Dose Route Start Last Admin





  Trade Name Prafulq  PRN Reason Stop Dose Admin


 


Acetaminophen  650 mg  10/15/18 16:05  





  Tylenol -  PO   





  Q4H PRN   





  PAIN LEVEL 4 - 6   





     





     





     


 


Diphenhydramine HCl  25 mg  10/15/18 16:05  





  Benadryl Injection -  IVPUSH   





  Q4H PRN   





  Pruritis   





     





     





     


 


Donepezil HCl  5 mg  10/16/18 10:00  10/17/18 10:20





  Aricept -  PO   5 mg





  DAILY EVIE   Administration





     





     





     





     


 


Hydromorphone HCl  10 mg  10/15/18 20:30  10/17/18 05:51





  Dilaudid Pca -  PCA  10/22/18 20:24  10 mg





  PCA EVIE   Administration





     





     





  Protocol   





     


 


Lactated Ringer's  1,000 mls @ 125 mls/hr  10/15/18 16:15  10/17/18 06:59





  Lactated Ringers Solution  IV   125 mls/hr





  ASDIR EVIE   Administration





     





     





     





     


 


Magnesium Oxide  400 mg  10/17/18 10:00  10/17/18 10:20





  Mag-Ox -  PO   400 mg





  DAILY EVIE   Administration





     





     





     





     


 


Omega-3-Acid Ethyl Esters  2 gm  10/16/18 11:00  10/17/18 10:20





  Lovaza -  PO   2 gm





  DAILY EVIE   Administration





     





     





     





     


 


Ondansetron HCl  4 mg  10/15/18 16:05  





  Zofran Injection  IVPUSH   





  Q4H PRN   





  NAUSEA   





     





     





     


 


Ondansetron HCl  4 mg  10/15/18 20:20  





  Zofran Injection  IVPUSH   





  Q4H PRN   





  NAUSEA AND/OR VOMITING   





     





     





     


 


Promethazine HCl  12.5 mg  10/15/18 20:20  





  Phenergan Injection -  IVPB   





  Q6H PRN   





  NAUSEA AND/OR VOMITING   





     





     





     











ASSESSMENT/PLAN:


Patient is a 73 year old male with history of thoracic kyphoscoliosis, adjacent 

level disease, segmental instability, spinal stenosis, GERD, anemia. He is POD #

2 s/p L2, L3, L4 laminectomy, T6-T12 facetectomy, L1-L2, L2-L3 SPO's and T6-S1 

PISF.





Thoracic kyphoscoliosis


-POD #2 s/p L2, L3, L4 laminectomy, T6-T12 facetectomy, L1-L2, L2-L3 SPO's and 

T6-S1 PISF.


-Pain controlled with dilaudid PCA


-Tylenol 650mg PO Q6H PRN


-Incentive spirometry


-Advance diet once passing flatus/ bowel movement


-Zofran 4mg IV Q4H PRN for nausea


-PT evaluation


-Neurocheck Q4H 





HLD


-Lovaza 2grams PO daily





Hypomagnesemia


-Magnesium oxide 400mg PO daily





FEN


-IV LR at 125mL/ hour


-Follow CMP


-NPO until passing flatus/ bowel movement





Prophylaxis


-SCDs and TEDs b/l lower extremities per Dr. Andrews 





Disposition


-Continue care in ICU





Visit type





- Emergency Visit


Emergency Visit: Yes


ED Registration Date: 10/15/18


Care time: The patient presented to the Emergency Department on the above date 

and was hospitalized for further evaluation of their emergent condition.





- New Patient


This patient is new to me today: Yes


Date on this admission: 10/17/18





- Critical Care


Critical Care patient: Yes


Total Critical Care Time (in minutes): 35


Critical Care Statement: The care of this patient involved high complexity 

decision making to prevent further life threatening deterioration of the patient

's condition and/or to evaluate & treat vital organ system(s) failure or risk 

of failure.





- Discharge Referral


Referred to [facility] No

## 2018-10-17 NOTE — PN
Teaching Attending Note


Name of Resident: Josue Cortes





ATTENDING PHYSICIAN STATEMENT





I saw and evaluated the patient.


I reviewed the resident's note and discussed the case with the resident.


I agree with the resident's findings and plan as documented.








SUBJECTIVE:





Pt seen and examined in the ICU. Pain controlled with current regimen. No 

flatus yet. No shortness of breath or chest pain.





OBJECTIVE:





 Vital Signs











 Period  Temp  Pulse  Resp  BP Sys/Calloway  Pulse Ox


 


 Last 24 Hr  98.2 F-98.6 F  64-80  10-20  103-131/52-82  98








 Intake & Output











 10/14/18 10/15/18 10/16/18 10/17/18





 23:59 23:59 23:59 23:59


 


Intake Total  3650 3240 1433


 


Output Total  720 2990 1160


 


Balance  2930 250 273


 


Weight   79.605 kg 80.343 kg








Gen:  NAD in chair


Heart: RRR


Lung: decreased breath sounds at the bases


Abd: soft, nontender


Ext: no edema





 CBC, BMP





 10/17/18 05:30 





 10/17/18 05:30 





Active Medications





Acetaminophen (Tylenol -)  650 mg PO Q4H PRN


   PRN Reason: PAIN LEVEL 4 - 6


Diphenhydramine HCl (Benadryl Injection -)  25 mg IVPUSH Q4H PRN


   PRN Reason: Pruritis


Donepezil HCl (Aricept -)  5 mg PO DAILY [facility]


   Last Admin: 10/17/18 10:20 Dose:  5 mg


Hydromorphone HCl (Dilaudid Pca -)  10 mg PCA PCA [facility]; Protocol


   Stop: 10/22/18 20:24


   Last Admin: 10/17/18 05:51 Dose:  10 mg


Lactated Ringer's (Lactated Ringers Solution)  1,000 mls @ 125 mls/hr IV ASDIR 

[facility]


   Last Admin: 10/17/18 06:59 Dose:  125 mls/hr


Magnesium Oxide (Mag-Ox -)  400 mg PO DAILY [facility]


   Last Admin: 10/17/18 10:20 Dose:  400 mg


Omega-3-Acid Ethyl Esters (Lovaza -)  2 gm PO DAILY [facility]


   Last Admin: 10/17/18 10:20 Dose:  2 gm


Ondansetron HCl (Zofran Injection)  4 mg IVPUSH Q4H PRN


   PRN Reason: NAUSEA


Ondansetron HCl (Zofran Injection)  4 mg IVPUSH Q4H PRN


   PRN Reason: NAUSEA AND/OR VOMITING


Promethazine HCl (Phenergan Injection -)  12.5 mg IVPB Q6H PRN


   PRN Reason: NAUSEA AND/OR VOMITING








ASSESSMENT AND PLAN:


Thoracic Kypho-scoliosis


s/p L2, L3, L4 laminectomies/T6-T12 Facetectomies/L1-L2, L2-L3 osteotomies/T6-

S1 PISF





-  pain control


-  incentive spirometry


-  monitor drain output


-  PO when flatus


-  d/c espinoza


-  DVT prophylaxis/disposition per surgery

## 2018-10-18 LAB
ANION GAP SERPL CALC-SCNC: 6 MMOL/L (ref 8–16)
BASOPHILS # BLD: 0.1 % (ref 0–2)
BUN SERPL-MCNC: 11 MG/DL (ref 7–18)
CALCIUM SERPL-MCNC: 8 MG/DL (ref 8.5–10.1)
CHLORIDE SERPL-SCNC: 105 MMOL/L (ref 98–107)
CO2 SERPL-SCNC: 30 MMOL/L (ref 21–32)
CREAT SERPL-MCNC: 0.7 MG/DL (ref 0.55–1.3)
DEPRECATED RDW RBC AUTO: 12.5 % (ref 11.9–15.9)
EOSINOPHIL # BLD: 0.2 % (ref 0–4.5)
GLUCOSE SERPL-MCNC: 97 MG/DL (ref 74–106)
HCT VFR BLD CALC: 26.3 % (ref 35.4–49)
HGB BLD-MCNC: 8.8 GM/DL (ref 11.7–16.9)
LYMPHOCYTES # BLD: 4 % (ref 8–40)
MAGNESIUM SERPL-MCNC: 2 MG/DL (ref 1.8–2.4)
MCH RBC QN AUTO: 30.6 PG (ref 25.7–33.7)
MCHC RBC AUTO-ENTMCNC: 33.3 G/DL (ref 32–35.9)
MCV RBC: 91.8 FL (ref 80–96)
MONOCYTES # BLD AUTO: 8 % (ref 3.8–10.2)
NEUTROPHILS # BLD: 87.7 % (ref 42.8–82.8)
PHOSPHATE SERPL-MCNC: 2 MG/DL (ref 2.5–4.9)
PLATELET # BLD AUTO: 123 K/MM3 (ref 134–434)
PMV BLD: 9.2 FL (ref 7.5–11.1)
POTASSIUM SERPLBLD-SCNC: 4.3 MMOL/L (ref 3.5–5.1)
RBC # BLD AUTO: 2.87 M/MM3 (ref 4–5.6)
SODIUM SERPL-SCNC: 141 MMOL/L (ref 136–145)
WBC # BLD AUTO: 8.7 K/MM3 (ref 4–10)

## 2018-10-18 RX ADMIN — DONEPEZIL HYDROCHLORIDE SCH MG: 5 TABLET, FILM COATED ORAL at 09:56

## 2018-10-18 RX ADMIN — DOCUSATE SODIUM,SENNOSIDES SCH TABLET: 50; 8.6 TABLET, FILM COATED ORAL at 22:00

## 2018-10-18 RX ADMIN — DOCUSATE SODIUM,SENNOSIDES SCH TABLET: 50; 8.6 TABLET, FILM COATED ORAL at 09:57

## 2018-10-18 RX ADMIN — OMEGA-3-ACID ETHYL ESTERS SCH GM: 1 CAPSULE, LIQUID FILLED ORAL at 09:57

## 2018-10-18 RX ADMIN — HYDROMORPHONE HYDROCHLORIDE SCH MG: 10 INJECTION, SOLUTION INTRAMUSCULAR; INTRAVENOUS; SUBCUTANEOUS at 09:48

## 2018-10-18 RX ADMIN — HYDROMORPHONE HYDROCHLORIDE SCH MG: 10 INJECTION, SOLUTION INTRAMUSCULAR; INTRAVENOUS; SUBCUTANEOUS at 20:30

## 2018-10-18 RX ADMIN — POLYETHYLENE GLYCOL 3350 SCH GM: 17 POWDER, FOR SOLUTION ORAL at 12:23

## 2018-10-18 RX ADMIN — MAGNESIUM OXIDE TAB 400 MG (241.3 MG ELEMENTAL MG) SCH MG: 400 (241.3 MG) TAB at 09:57

## 2018-10-18 RX ADMIN — POLYETHYLENE GLYCOL 3350 SCH GM: 17 POWDER, FOR SOLUTION ORAL at 22:50

## 2018-10-18 NOTE — PN
Teaching Attending Note


Name of Resident: Josue Cortes





ATTENDING PHYSICIAN STATEMENT





I saw and evaluated the patient.


I reviewed the resident's note and discussed the case with the resident.


I agree with the resident's findings and plan as documented.








SUBJECTIVE:





Pt seen and examined in the ICU. Pain controlled. +flatus, tolerating liquids. 

No fevers or chills.





OBJECTIVE:





 Vital Signs











 Period  Temp  Pulse  Resp  BP Sys/Calloway  Pulse Ox


 


 Last 24 Hr  98.0 F-98.4 F    12-29  121-155/40-88  98








 Intake & Output











 10/15/18 10/16/18 10/17/18 10/18/18





 23:59 23:59 23:59 23:59


 


Intake Total 3650 3240 3558 400


 


Output Total 720 2990 2160 


 


Balance 2930 250 1398 400


 


Weight  79.605 kg 80.286 kg 








Gen:  NAD in chair


Heart: RRR


Lung: decreased breath sounds at the bases


Abd: soft, nontender


Ext: no edema





 CBC, BMP





 10/18/18 05:30 





 10/18/18 05:30 





Active Medications





Acetaminophen (Tylenol -)  650 mg PO Q4H PRN


   PRN Reason: PAIN LEVEL 4 - 6


Diphenhydramine HCl (Benadryl Injection -)  25 mg IVPUSH Q4H PRN


   PRN Reason: Pruritis


Docusate Sodium (Colace -)  100 mg PO BID PRN


   PRN Reason: CONSTIPATION


Donepezil HCl (Aricept -)  5 mg PO DAILY [facility]


   Last Admin: 10/18/18 09:56 Dose:  5 mg


Hydromorphone HCl (Dilaudid Pca -)  10 mg PCA PCA [facility]; Protocol


   Stop: 10/22/18 20:24


   Last Admin: 10/18/18 09:48 Dose:  10 mg


Magnesium Oxide (Mag-Ox -)  400 mg PO DAILY [facility]


   Last Admin: 10/18/18 09:57 Dose:  400 mg


Omega-3-Acid Ethyl Esters (Lovaza -)  2 gm PO DAILY [facility]


   Last Admin: 10/18/18 09:57 Dose:  2 gm


Ondansetron HCl (Zofran Injection)  4 mg IVPUSH Q4H PRN


   PRN Reason: NAUSEA


Ondansetron HCl (Zofran Injection)  4 mg IVPUSH Q4H PRN


   PRN Reason: NAUSEA AND/OR VOMITING


Polyethylene Glycol (Miralax (For Daily Use) -)  17 gm PO BID [facility]


Promethazine HCl (Phenergan Injection -)  12.5 mg IVPB Q6H PRN


   PRN Reason: NAUSEA AND/OR VOMITING


Senna/Docusate Sodium (Pericolace -)  1 tablet PO BID [facility]


   Last Admin: 10/18/18 09:57 Dose:  1 tablet








ASSESSMENT AND PLAN:


Thoracic Kypho-scoliosis


s/p L2, L3, L4 laminectomies/T6-T12 Facetectomies/L1-L2, L2-L3 osteotomies/T6-

S1 PISF





-  pain control


-  incentive spirometry


-  monitor drain output


-  advance diet as tolerated


-  DVT prophylaxis/disposition per surgery

## 2018-10-18 NOTE — PN
Physical Exam: 


SUBJECTIVE: Patient seen and examined at bedside this morning. He admits 

passing flatus overnight. Admits pain currently, and is using the PCA dilaudid. 

Urinating clear light yellow urine into espinoza catheter. Wound vac draining 

well. Denies fevers, chills, shortness of breath, chest pain, palpitations, 

abdominal pain, nausea, vomiting. 








OBJECTIVE:


 Vital Signs











Temperature  98.2 F   10/18/18 02:00


 


Pulse Rate  60   10/18/18 06:00


 


Respiratory Rate  12   10/18/18 06:00


 


Blood Pressure  140/52 L  10/18/18 06:00


 


O2 Sat by Pulse Oximetry (%)  98   10/17/18 20:08








GENERAL: The patient is awake, alert, and fully oriented, in no acute distress.


HEAD: Normal with no signs of trauma.


EYES: PERRLA, extraocular movements intact without nystagmus, sclera anicteric, 

conjunctiva clear. 


ENT: Oropharynx clear without exudates, moist mucous membranes.


NECK: Trachea midline, full range of motion, supple without lymphadenopathy


LUNGS: Good inspiratory effort and air entry b/l. Breath sounds equal, clear to 

auscultation b/l. No wheezes, no crackles. No accessory muscle use. 


HEART: Regular rate and rhythm, S1, S2 without murmur, rub or gallop.


ABDOMEN: Soft, nontender, nondistended, normoactive bowel sounds. No guarding, 

no rebound tenderness. No hepatosplenomegaly appreciated.


EXTREMITIES: 2+ radial and dorsalis pedis pulses b/l. Warm, well-perfused. No 

lower extremity edema b/l. 


NEUROLOGICAL: Cranial nerves II through XII grossly intact. 2+ biceps, 

brachioradialis, and patellar reflexes b/l. Strength 4/5 b/l upper extremities 

in flexion, extension, abduction, adduction, and lower extremities in hip 

flexion, extension, knee flexion, extension, plantarflexion, dorsiflexion.


PSYCH: Normal mood, normal affect upon my encounter today.


SKIN: Warm, dry. Surgical site along spine bandaged, clean, not bleeding, no 

discharge.








 Laboratory Results - last 24 hr











  10/18/18 10/18/18





  05:30 05:30


 


WBC  8.7 


 


RBC  2.87 L 


 


Hgb  8.8 L 


 


Hct  26.3 L 


 


MCV  91.8 


 


MCH  30.6 


 


MCHC  33.3 


 


RDW  12.5 


 


Plt Count  123 L D 


 


MPV  9.2 


 


Absolute Neuts (auto)  7.6 


 


Neutrophils %  87.7 H 


 


Lymphocytes %  4.0 L 


 


Monocytes %  8.0 


 


Eosinophils %  0.2 


 


Basophils %  0.1 


 


Nucleated RBC %  0 


 


Sodium   141


 


Potassium   4.3


 


Chloride   105


 


Carbon Dioxide   30


 


Anion Gap   6 L


 


BUN   11


 


Creatinine   0.7


 


Creat Clearance w eGFR   > 60


 


Random Glucose   97


 


Calcium   8.0 L


 


Phosphorus   2.0 L


 


Magnesium   2.0








Active Medications











Generic Name Dose Route Start Last Admin





  Trade Name Freq  PRN Reason Stop Dose Admin


 


Acetaminophen  650 mg  10/15/18 16:05  





  Tylenol -  PO   





  Q4H PRN   





  PAIN LEVEL 4 - 6   





     





     





     


 


Diphenhydramine HCl  25 mg  10/15/18 16:05  





  Benadryl Injection -  IVPUSH   





  Q4H PRN   





  Pruritis   





     





     





     


 


Donepezil HCl  5 mg  10/16/18 10:00  10/17/18 10:20





  Aricept -  PO   5 mg





  DAILY EVIE   Administration





     





     





     





     


 


Hydromorphone HCl  10 mg  10/15/18 20:30  10/17/18 21:18





  Dilaudid Pca -  PCA  10/22/18 20:24  Not Given





  PCA EVIE   





     





     





  Protocol   





     


 


Magnesium Oxide  400 mg  10/17/18 10:00  10/17/18 10:20





  Mag-Ox -  PO   400 mg





  DAILY EVIE   Administration





     





     





     





     


 


Omega-3-Acid Ethyl Esters  2 gm  10/16/18 11:00  10/17/18 10:20





  Lovaza -  PO   2 gm





  DAILY EVIE   Administration





     





     





     





     


 


Ondansetron HCl  4 mg  10/15/18 16:05  





  Zofran Injection  IVPUSH   





  Q4H PRN   





  NAUSEA   





     





     





     


 


Ondansetron HCl  4 mg  10/15/18 20:20  





  Zofran Injection  IVPUSH   





  Q4H PRN   





  NAUSEA AND/OR VOMITING   





     





     





     


 


Potassium Phos/Sodium Phos  1 packet  10/18/18 07:19  





  Phos-Nak Packet -  PO  10/18/18 07:20  





  ONCE ONE   





     





     





     





     


 


Promethazine HCl  12.5 mg  10/15/18 20:20  





  Phenergan Injection -  IVPB   





  Q6H PRN   





  NAUSEA AND/OR VOMITING   





     





     





     











ASSESSMENT/PLAN:


Patient is a 73 year old male with history of thoracic kyphoscoliosis, adjacent 

level disease, segmental instability, spinal stenosis, GERD, anemia. He is POD #

3 s/p L2, L3, L4 laminectomy, T6-T12 facetectomy, L1-L2, L2-L3 SPO's and T6-S1 

PISF.





Thoracic kyphoscoliosis


-POD #3 s/p L2, L3, L4 laminectomy, T6-T12 facetectomy, L1-L2, L2-L3 SPO's and 

T6-S1 PISF.


-Pain control with dilaudid PCA


As per conversation with Dr. Andrews, will give one time order of Toradol 30mg 

for the pain.


-Tylenol 650mg PO Q6H PRN


-Incentive spirometry


-Advanced diet to regular diet


-Zofran 4mg IV Q4H PRN for nausea


-PT evaluation


-Neurocheck Q4H 





HLD


-Lovaza 2grams PO daily





Hypomagnesemia


-Magnesium oxide 400mg PO daily





FEN


-No IV fluids. Patient tolerating oral intake. 


-Follow CMP


-Regular diet





Prophylaxis


-SCDs and TEDs b/l lower extremities. Per conversation with Dr. Andrews, no 

chemical anticoagulation at this time.





Disposition


-Continue care in ICU





Visit type





- Emergency Visit


Emergency Visit: Yes


ED Registration Date: 10/15/18


Care time: The patient presented to the Emergency Department on the above date 

and was hospitalized for further evaluation of their emergent condition.





- New Patient


This patient is new to me today: No





- Critical Care


Critical Care patient: Yes


Total Critical Care Time (in minutes): 35


Critical Care Statement: The care of this patient involved high complexity 

decision making to prevent further life threatening deterioration of the patient

's condition and/or to evaluate & treat vital organ system(s) failure or risk 

of failure.





- Discharge Referral


Referred to [facility] No

## 2018-10-18 NOTE — PN
Teaching Attending Note


Name of Resident: Cruz Harmon





ATTENDING PHYSICIAN STATEMENT





I saw and evaluated the patient.


I reviewed the resident's note and discussed the case with the resident.


I agree with the resident's findings and plan as documented with exceptions 

below.








SUBJECTIVE:


Patient seen and examined. currently pain well controlled, passing gas, voiding

, no BM yet. 





OBJECTIVE:


 Vital Signs











 Period  Temp  Pulse  Resp  BP Sys/Calloway  Pulse Ox


 


 Last 24 Hr  98.0 F-98.4 F    12-29  114-155/40-88  98-98








 Intake & Output











 10/15/18 10/16/18 10/17/18 10/18/18





 23:59 23:59 23:59 23:59


 


Intake Total 3650 3240 3558 


 


Output Total 720 2990 2160 


 


Balance 2930 250 1398 


 


Weight  175 lb 8 oz 177 lb 








general: sitting in chair in no acute distress


Chest: CTAB, no rales or wheezing


Abdomen: soft, distended, positive bowel sounds, NT throughout


Musculoskeletal: Dressing along thoracolumber spine, no swelling/erythema or 

discharge noted, dressing clean


Extremities:no edema





Active Medications





Acetaminophen (Tylenol -)  650 mg PO Q4H PRN


   PRN Reason: PAIN LEVEL 4 - 6


Diphenhydramine HCl (Benadryl Injection -)  25 mg IVPUSH Q4H PRN


   PRN Reason: Pruritis


Donepezil HCl (Aricept -)  5 mg PO DAILY [facility]


   Last Admin: 10/17/18 10:20 Dose:  5 mg


Hydromorphone HCl (Dilaudid Pca -)  10 mg PCA PCA [facility]; Protocol


   Stop: 10/22/18 20:24


   Last Admin: 10/17/18 21:18 Dose:  Not Given


Magnesium Oxide (Mag-Ox -)  400 mg PO DAILY [facility]


   Last Admin: 10/17/18 10:20 Dose:  400 mg


Omega-3-Acid Ethyl Esters (Lovaza -)  2 gm PO DAILY [facility]


   Last Admin: 10/17/18 10:20 Dose:  2 gm


Ondansetron HCl (Zofran Injection)  4 mg IVPUSH Q4H PRN


   PRN Reason: NAUSEA


Ondansetron HCl (Zofran Injection)  4 mg IVPUSH Q4H PRN


   PRN Reason: NAUSEA AND/OR VOMITING


Promethazine HCl (Phenergan Injection -)  12.5 mg IVPB Q6H PRN


   PRN Reason: NAUSEA AND/OR VOMITING


Senna/Docusate Sodium (Pericolace -)  1 tablet PO BID [facility]





 Laboratory Results - last 24 hr











  10/18/18 10/18/18





  05:30 05:30


 


WBC  8.7 


 


RBC  2.87 L 


 


Hgb  8.8 L 


 


Hct  26.3 L 


 


MCV  91.8 


 


MCH  30.6 


 


MCHC  33.3 


 


RDW  12.5 


 


Plt Count  123 L D 


 


MPV  9.2 


 


Absolute Neuts (auto)  7.6 


 


Neutrophils %  87.7 H 


 


Lymphocytes %  4.0 L 


 


Monocytes %  8.0 


 


Eosinophils %  0.2 


 


Basophils %  0.1 


 


Nucleated RBC %  0 


 


Sodium   141


 


Potassium   4.3


 


Chloride   105


 


Carbon Dioxide   30


 


Anion Gap   6 L


 


BUN   11


 


Creatinine   0.7


 


Creat Clearance w eGFR   > 60


 


Random Glucose   97


 


Calcium   8.0 L


 


Phosphorus   2.0 L


 


Magnesium   2.0














ASSESSMENT AND PLAN:


73 year old man with a history of kyphoscoliosis, hypertriglyceridemia, 

hypomagnesemia, anemia, GERD who was admitted for spine surgery.





- Thoracic kyphoscoliosis with adjacent level disease and segmental instability 

s/p L2, L3, L4 laminectomies; T6-T12 facetectomies; L1-L2, L2-L3 SPOs; T6-S1 

PISF 10/15


-Acute blood loss anemia compounded by hemodilution


-Hypertriglyceridemia


-Hypomagnesemia


-Anemia


-GERD





Plan:


Doing well,


PCA, d/c in 24 hours if no concerns.


Advance diet as tolerated


Incentive spirometry, ambulation, PT eval. 


Place on aggressive bowel regimen.


Monitor h/h


Replete lytes prn


monitor h/h 


continue lovaza


DVTPPX/dispo per spine surgery. 


Plan discussed with patient in detail, all questions answered.


Care co-ordinated with ICU team and Dr. Andrews.


Total critical care time spent in ICU 35 min.

## 2018-10-18 NOTE — PN
Physical Exam: 


SUBJECTIVE: Patient seen and examined at bedside. Complains of pain at surgical 

site, otherwise comfortable. Urinating well after espinoza d/c. Passing flatus, 

started on liquid diet, no BM as yet, low appetite. 








OBJECTIVE:





 Vital Signs











 Period  Temp  Pulse  Resp  BP Sys/Calloway  Pulse Ox


 


 Last 24 Hr  98.0 F-98.4 F    12-29  114-155/40-88  98-98











GENERAL: A&Ox3


HEAD: NC/AT


EYES: PERRLA, EOMI, sclera anicteric, conjunctiva clear. No ptosis. 


ENT: Ears normal, nares patent, oropharynx clear without exudates, moist mucous 


membranes.


NECK: Trachea midline, full range of motion, supple. 


LUNGS: Breath sounds equal, clear to auscultation bilaterally, no wheezes, no 

crackles, no 


accessory muscle use. 


HEART: Regular rate and rhythm, S1, S2 without murmur, rub or gallop.


ABDOMEN: Soft, nontender, nondistended, normoactive bowel sounds, no guarding, 

no 


rebound, no hepatosplenomegaly, no masses.


EXTREMITIES: 2+ pulses, warm, well-perfused, no edema. 


NEUROLOGICAL: CNs intact b/l, sensation intact throughout, no focal motor 

deficits


PSYCH: Normal mood, normal affect.


SKIN: Warm, dry, normal turgor, no rashes or lesions noted


DRAINS: vac drain 60 ml yesterday














 Laboratory Results - last 24 hr











  10/18/18 10/18/18





  05:30 05:30


 


WBC  8.7 


 


RBC  2.87 L 


 


Hgb  8.8 L 


 


Hct  26.3 L 


 


MCV  91.8 


 


MCH  30.6 


 


MCHC  33.3 


 


RDW  12.5 


 


Plt Count  123 L D 


 


MPV  9.2 


 


Absolute Neuts (auto)  7.6 


 


Neutrophils %  87.7 H 


 


Lymphocytes %  4.0 L 


 


Monocytes %  8.0 


 


Eosinophils %  0.2 


 


Basophils %  0.1 


 


Nucleated RBC %  0 


 


Sodium   141


 


Potassium   4.3


 


Chloride   105


 


Carbon Dioxide   30


 


Anion Gap   6 L


 


BUN   11


 


Creatinine   0.7


 


Creat Clearance w eGFR   > 60


 


Random Glucose   97


 


Calcium   8.0 L


 


Phosphorus   2.0 L


 


Magnesium   2.0








Active Medications











Generic Name Dose Route Start Last Admin





  Trade Name Freq  PRN Reason Stop Dose Admin


 


Acetaminophen  650 mg  10/15/18 16:05  





  Tylenol -  PO   





  Q4H PRN   





  PAIN LEVEL 4 - 6   





     





     





     


 


Diphenhydramine HCl  25 mg  10/15/18 16:05  





  Benadryl Injection -  IVPUSH   





  Q4H PRN   





  Pruritis   





     





     





     


 


Donepezil HCl  5 mg  10/16/18 10:00  10/17/18 10:20





  Aricept -  PO   5 mg





  DAILY EVIE   Administration





     





     





     





     


 


Hydromorphone HCl  10 mg  10/15/18 20:30  10/17/18 21:18





  Dilaudid Pca -  PCA  10/22/18 20:24  Not Given





  PCA [facility]   





     





     





  Protocol   





     


 


Magnesium Oxide  400 mg  10/17/18 10:00  10/17/18 10:20





  Mag-Ox -  PO   400 mg





  DAILY EVIE   Administration





     





     





     





     


 


Omega-3-Acid Ethyl Esters  2 gm  10/16/18 11:00  10/17/18 10:20





  Lovaza -  PO   2 gm





  DAILY EVIE   Administration





     





     





     





     


 


Ondansetron HCl  4 mg  10/15/18 16:05  





  Zofran Injection  IVPUSH   





  Q4H PRN   





  NAUSEA   





     





     





     


 


Ondansetron HCl  4 mg  10/15/18 20:20  





  Zofran Injection  IVPUSH   





  Q4H PRN   





  NAUSEA AND/OR VOMITING   





     





     





     


 


Promethazine HCl  12.5 mg  10/15/18 20:20  





  Phenergan Injection -  IVPB   





  Q6H PRN   





  NAUSEA AND/OR VOMITING   





     





     





     











ASSESSMENT/PLAN:





72 y/o M w/ PMHx anemia, hypertriglyceridemia, GERD, thoracic kypho-scoliosis/

Adjacent level disease/Segmental instability s/p L2, L3, L4 laminectomies, T6-

T12 facetectomies, L1-L2, L2-L3 SPO's and T6-S1 PISF.





#Thoracic kyphoscoliosis s/p extensive spinal Sx as above


-POD 3


-cont dilaudid PCA


-no NSAIDs


-zofran, compazine PRN


-monitor drain output


-tolerated full liquid, restart regular diet


-PT/OT: walked 150 ft w/ PT


-neuro checks qshift


-incentive spirometry





#anemia


-appears dilutional


-monitor CBC





#FEN:


-no IVF


-monitor, replete as necessary


-liquid diet currently, advance to regular if tolerated





#PPx:


-DVT: SCDs/TEDs no pharmacologic AC per neuroSx


-GI: pericolace, miralax





#dispo


-monitor in ICU











Visit type





- Emergency Visit


Emergency Visit: No





- New Patient


This patient is new to me today: No





- Critical Care


Critical Care patient: Yes


Total Critical Care Time (in minutes): 40


Critical Care Statement: The care of this patient involved high complexity 

decision making to prevent further life threatening deterioration of the patient

's condition and/or to evaluate & treat vital organ system(s) failure or risk 

of failure.

## 2018-10-18 NOTE — PN
Progress Note (short form)





- Note


Progress Note: 





POD #3 - s/p T6-S1 laminectomy revision/fusion under GA with dilaudid PCA for 

post-op pain management. VSS.


Pt. doing well, sitting up comfortably in chair. Tolerating rehab. Overall pain 

control has been improved. Will continue


IV PCA one more day and consider changing to PO meds tomorrow.

## 2018-10-19 LAB
ANION GAP SERPL CALC-SCNC: 7 MMOL/L (ref 8–16)
BASOPHILS # BLD: 0.2 % (ref 0–2)
BUN SERPL-MCNC: 13 MG/DL (ref 7–18)
CALCIUM SERPL-MCNC: 7.9 MG/DL (ref 8.5–10.1)
CHLORIDE SERPL-SCNC: 102 MMOL/L (ref 98–107)
CO2 SERPL-SCNC: 29 MMOL/L (ref 21–32)
CREAT SERPL-MCNC: 0.8 MG/DL (ref 0.55–1.3)
DEPRECATED RDW RBC AUTO: 12.7 % (ref 11.9–15.9)
EOSINOPHIL # BLD: 2.2 % (ref 0–4.5)
GLUCOSE SERPL-MCNC: 94 MG/DL (ref 74–106)
HCT VFR BLD CALC: 29.1 % (ref 35.4–49)
HGB BLD-MCNC: 9.7 GM/DL (ref 11.7–16.9)
LYMPHOCYTES # BLD: 4.5 % (ref 8–40)
MAGNESIUM SERPL-MCNC: 2.1 MG/DL (ref 1.8–2.4)
MCH RBC QN AUTO: 30.6 PG (ref 25.7–33.7)
MCHC RBC AUTO-ENTMCNC: 33.4 G/DL (ref 32–35.9)
MCV RBC: 91.5 FL (ref 80–96)
MONOCYTES # BLD AUTO: 8.9 % (ref 3.8–10.2)
NEUTROPHILS # BLD: 84.2 % (ref 42.8–82.8)
PHOSPHATE SERPL-MCNC: 2.2 MG/DL (ref 2.5–4.9)
PLATELET # BLD AUTO: 159 K/MM3 (ref 134–434)
PMV BLD: 9.2 FL (ref 7.5–11.1)
POTASSIUM SERPLBLD-SCNC: 3.7 MMOL/L (ref 3.5–5.1)
RBC # BLD AUTO: 3.18 M/MM3 (ref 4–5.6)
SODIUM SERPL-SCNC: 139 MMOL/L (ref 136–145)
WBC # BLD AUTO: 8 K/MM3 (ref 4–10)

## 2018-10-19 RX ADMIN — DOCUSATE SODIUM,SENNOSIDES SCH TABLET: 50; 8.6 TABLET, FILM COATED ORAL at 10:40

## 2018-10-19 RX ADMIN — OMEGA-3-ACID ETHYL ESTERS SCH GM: 1 CAPSULE, LIQUID FILLED ORAL at 10:40

## 2018-10-19 RX ADMIN — POLYETHYLENE GLYCOL 3350 SCH GM: 17 POWDER, FOR SOLUTION ORAL at 10:40

## 2018-10-19 RX ADMIN — DOCUSATE SODIUM,SENNOSIDES SCH TABLET: 50; 8.6 TABLET, FILM COATED ORAL at 22:16

## 2018-10-19 RX ADMIN — DONEPEZIL HYDROCHLORIDE SCH MG: 5 TABLET, FILM COATED ORAL at 16:09

## 2018-10-19 RX ADMIN — MAGNESIUM OXIDE TAB 400 MG (241.3 MG ELEMENTAL MG) SCH MG: 400 (241.3 MG) TAB at 10:40

## 2018-10-19 NOTE — PN
Physical Exam: 


SUBJECTIVE: Patient seen and examined at bedside. Complains of pain at surgical 

site, otherwise comfortable. Urinating well after espinoza d/c. Passing flatus, 

advanced to regular diet, no BM as yet, low appetite, intermittent nausea.








OBJECTIVE:





 Vital Signs











 Period  Temp  Pulse  Resp  BP Sys/Calloway  Pulse Ox


 


 Last 24 Hr  98.1 F-98.2 F  66-78  16-24  111-144/54-97  











GENERAL: A&Ox3


HEAD: NC/AT


EYES: PERRLA, EOMI, sclera anicteric, conjunctiva clear. No ptosis. 


ENT: Ears normal, nares patent, oropharynx clear without exudates, moist mucous 


membranes.


NECK: Trachea midline, full range of motion, supple. 


LUNGS: Breath sounds equal, clear to auscultation bilaterally, no wheezes, no 

crackles, no 


accessory muscle use. 


HEART: Regular rate and rhythm, S1, S2 without murmur, rub or gallop.


ABDOMEN: Soft, nontender, nondistended, normoactive bowel sounds, no guarding, 

no 


rebound, no hepatosplenomegaly, no masses.


EXTREMITIES: 2+ pulses, warm, well-perfused, no edema. 


NEUROLOGICAL: CNs intact b/l, sensation intact throughout, no focal motor 

deficits


PSYCH: Normal mood, normal affect.


SKIN: Warm, dry, normal turgor, no rashes or lesions noted


DRAINS: vac drain 5 ml yesterday, removed this AM














 Laboratory Results - last 24 hr











  10/19/18 10/19/18





  05:30 05:30


 


WBC  8.0 


 


RBC  3.18 L 


 


Hgb  9.7 L 


 


Hct  29.1 L 


 


MCV  91.5 


 


MCH  30.6 


 


MCHC  33.4 


 


RDW  12.7 


 


Plt Count  159  D 


 


MPV  9.2 


 


Absolute Neuts (auto)  6.8 


 


Neutrophils %  84.2 H 


 


Lymphocytes %  4.5 L 


 


Monocytes %  8.9 


 


Eosinophils %  2.2  D 


 


Basophils %  0.2 


 


Nucleated RBC %  0 


 


Sodium   139


 


Potassium   3.7


 


Chloride   102


 


Carbon Dioxide   29


 


Anion Gap   7 L


 


BUN   13


 


Creatinine   0.8


 


Creat Clearance w eGFR   > 60


 


Random Glucose   94


 


Calcium   7.9 L


 


Phosphorus   2.2 L


 


Magnesium   2.1








Active Medications











Generic Name Dose Route Start Last Admin





  Trade Name Freq  PRN Reason Stop Dose Admin


 


Acetaminophen  650 mg  10/15/18 16:05  





  Tylenol -  PO   





  Q4H PRN   





  PAIN LEVEL 4 - 6   





     





     





     


 


Acetaminophen  325 mg  10/19/18 09:27  





  Tylenol -  PO   





  Q4H PRN   





  PAIN LEVEL 4 - 6   





     





     





     


 


Bisacodyl  10 mg  10/19/18 09:25  





  Dulcolax Suppository -  RC   





  DAILY PRN   





  CONSTIPATION   





     





     





     


 


Diphenhydramine HCl  25 mg  10/15/18 16:05  





  Benadryl Injection -  IVPUSH   





  Q4H PRN   





  Pruritis   





     





     





     


 


Donepezil HCl  5 mg  10/16/18 10:00  10/18/18 09:56





  Aricept -  PO   5 mg





  DAILY EVIE   Administration





     





     





     





     


 


Magnesium Oxide  400 mg  10/17/18 10:00  10/19/18 10:40





  Mag-Ox -  PO   400 mg





  DAILY EVIE   Administration





     





     





     





     


 


Omega-3-Acid Ethyl Esters  2 gm  10/16/18 11:00  10/19/18 10:40





  Lovaza -  PO   2 gm





  DAILY EVIE   Administration





     





     





     





     


 


Ondansetron HCl  4 mg  10/15/18 16:05  10/19/18 10:43





  Zofran Injection  IVPUSH   4 mg





  Q4H PRN   Administration





  NAUSEA   





     





     





     


 


Ondansetron HCl  4 mg  10/15/18 20:20  





  Zofran Injection  IVPUSH   





  Q4H PRN   





  NAUSEA AND/OR VOMITING   





     





     





     


 


Ondansetron HCl  4 mg  10/19/18 08:58  





  Zofran Injection  IVPUSH   





  Q6H PRN   





  NAUSEA   





     





     





     


 


Oxycodone HCl  5 mg  10/19/18 09:23  





  Roxicodone -  PO   





  Q4H PRN   





  PAIN LEVEL 4 - 6   





     





     





     


 


Oxycodone HCl  10 mg  10/19/18 09:23  





  Roxicodone -  PO   





  Q4H PRN   





  PAIN LEVEL 7 - 10   





     





     





     


 


Polyethylene Glycol  17 gm  10/18/18 11:00  10/19/18 10:40





  Miralax (For Daily Use) -  PO   17 gm





  BID EVIE   Administration





     





     





     





     


 


Potassium Phos/Sodium Phos  1 packet  10/19/18 10:00  10/19/18 10:40





  Phos-Nak Packet -  PO   1 packet





  BID EVIE   Administration





     





     





     





     


 


Promethazine HCl  12.5 mg  10/15/18 20:20  





  Phenergan Injection -  IVPB   





  Q6H PRN   





  NAUSEA AND/OR VOMITING   





     





     





     


 


Senna/Docusate Sodium  1 tablet  10/18/18 10:00  10/19/18 10:40





  Pericolace -  PO   1 tablet





  BID EVIE   Administration





     





     





     





     











ASSESSMENT/PLAN:





72 y/o M w/ PMHx anemia, hypertriglyceridemia, GERD, thoracic kypho-scoliosis/

Adjacent level disease/Segmental instability s/p L2, L3, L4 laminectomies, T6-

T12 facetectomies, L1-L2, L2-L3 SPO's and T6-S1 PISF.





#Thoracic kyphoscoliosis s/p extensive spinal Sx as above


-POD 4


-d/c'd Dilaudid PCA


-morphine PRN


-no NSAIDs


-zofran PRN


-monitor drain output


-regular diet


-PT/OT: walked 150 ft w/ PT


-incentive spirometry





#anemia


-appears dilutional


-monitor CBC





#FEN:


-no IVF


-monitor, replete as necessary


-regular diet





#PPx:


-DVT: SCDs/TEDs no pharmacologic AC per neuroSx


-GI: pericolace, miralax





#dispo


-d/c to rehab facility or transfer to floors as per primary team











Visit type





- Emergency Visit


Emergency Visit: No





- New Patient


This patient is new to me today: No





- Critical Care


Critical Care patient: Yes


Total Critical Care Time (in minutes): 40


Critical Care Statement: The care of this patient involved high complexity 

decision making to prevent further life threatening deterioration of the patient

's condition and/or to evaluate & treat vital organ system(s) failure or risk 

of failure.

## 2018-10-19 NOTE — PN
Physical Exam: 


SUBJECTIVE: Patient seen and examined at bedside this morning. Still endorses 

pain. Tolerating regular diet without abdominal pain, nausea, vomiting, however 

does admit constipation. Urinating clear light yellow urine into espinoza 

catheter. Wound vac draining well. Denies fevers, chills, shortness of breath, 

chest pain, palpitations, abdominal pain, nausea, vomiting. 





OBJECTIVE:





 Vital Signs











 Period  Temp  Pulse  Resp  BP Sys/Calloway  Pulse Ox


 


 Last 24 Hr  98.1 F-98.2 F  66-78  16-20  111-144/54-97  











GENERAL: The patient is awake, alert, and fully oriented, in no acute distress.


HEAD: Normal with no signs of trauma.


EYES: PERRLA, extraocular movements intact without nystagmus, sclera anicteric, 

conjunctiva clear. 


ENT: Oropharynx clear without exudates, moist mucous membranes.


NECK: Trachea midline, full range of motion, supple without lymphadenopathy


LUNGS: Good inspiratory effort and air entry b/l. Breath sounds equal, clear to 

auscultation b/l. No wheezes, no crackles. No accessory muscle use. 


HEART: Regular rate and rhythm, S1, S2 without murmur, rub or gallop.


ABDOMEN: Soft, nontender, nondistended, normoactive bowel sounds. No guarding, 

no rebound tenderness. No hepatosplenomegaly appreciated.


EXTREMITIES: 2+ radial and dorsalis pedis pulses b/l. Warm, well-perfused. No 

lower extremity edema b/l. 


NEUROLOGICAL: Cranial nerves II through XII grossly intact. 2+ biceps, 

brachioradialis, and patellar reflexes b/l. Strength 4/5 b/l upper extremities 

in flexion, extension, abduction, adduction, and lower extremities in hip 

flexion, extension, knee flexion, extension, plantarflexion, dorsiflexion.


PSYCH: Normal mood, normal affect upon my encounter today.


SKIN: Warm, dry. Surgical site along spine bandaged, clean, not bleeding, no 

discharge.














 Laboratory Results - last 24 hr











  10/19/18 10/19/18





  05:30 05:30


 


WBC  8.0 


 


RBC  3.18 L 


 


Hgb  9.7 L 


 


Hct  29.1 L 


 


MCV  91.5 


 


MCH  30.6 


 


MCHC  33.4 


 


RDW  12.7 


 


Plt Count  159  D 


 


MPV  9.2 


 


Absolute Neuts (auto)  6.8 


 


Neutrophils %  84.2 H 


 


Lymphocytes %  4.5 L 


 


Monocytes %  8.9 


 


Eosinophils %  2.2  D 


 


Basophils %  0.2 


 


Nucleated RBC %  0 


 


Sodium   139


 


Potassium   3.7


 


Chloride   102


 


Carbon Dioxide   29


 


Anion Gap   7 L


 


BUN   13


 


Creatinine   0.8


 


Creat Clearance w eGFR   > 60


 


Random Glucose   94


 


Calcium   7.9 L


 


Phosphorus   2.2 L


 


Magnesium   2.1








Active Medications











Generic Name Dose Route Start Last Admin





  Trade Name Freq  PRN Reason Stop Dose Admin


 


Acetaminophen  650 mg  10/15/18 16:05  





  Tylenol -  PO   





  Q4H PRN   





  PAIN LEVEL 4 - 6   





     





     





     


 


Acetaminophen  325 mg  10/19/18 09:27  





  Tylenol -  PO   





  Q4H PRN   





  PAIN LEVEL 4 - 6   





     





     





     


 


Bisacodyl  10 mg  10/19/18 09:25  





  Dulcolax Suppository -  RC   





  DAILY PRN   





  CONSTIPATION   





     





     





     


 


Diphenhydramine HCl  25 mg  10/15/18 16:05  





  Benadryl Injection -  IVPUSH   





  Q4H PRN   





  Pruritis   





     





     





     


 


Donepezil HCl  5 mg  10/16/18 10:00  10/18/18 09:56





  Aricept -  PO   5 mg





  DAILY EVIE   Administration





     





     





     





     


 


Magnesium Oxide  400 mg  10/17/18 10:00  10/19/18 10:40





  Mag-Ox -  PO   400 mg





  DAILY EVIE   Administration





     





     





     





     


 


Omega-3-Acid Ethyl Esters  2 gm  10/16/18 11:00  10/19/18 10:40





  Lovaza -  PO   2 gm





  DAILY EVIE   Administration





     





     





     





     


 


Ondansetron HCl  4 mg  10/15/18 16:05  10/19/18 10:43





  Zofran Injection  IVPUSH   4 mg





  Q4H PRN   Administration





  NAUSEA   





     





     





     


 


Ondansetron HCl  4 mg  10/15/18 20:20  





  Zofran Injection  IVPUSH   





  Q4H PRN   





  NAUSEA AND/OR VOMITING   





     





     





     


 


Ondansetron HCl  4 mg  10/19/18 08:58  





  Zofran Injection  IVPUSH   





  Q6H PRN   





  NAUSEA   





     





     





     


 


Oxycodone HCl  5 mg  10/19/18 09:23  





  Roxicodone -  PO   





  Q4H PRN   





  PAIN LEVEL 4 - 6   





     





     





     


 


Oxycodone HCl  10 mg  10/19/18 09:23  





  Roxicodone -  PO   





  Q4H PRN   





  PAIN LEVEL 7 - 10   





     





     





     


 


Polyethylene Glycol  17 gm  10/18/18 11:00  10/19/18 10:40





  Miralax (For Daily Use) -  PO   17 gm





  BID EVIE   Administration





     





     





     





     


 


Potassium Phos/Sodium Phos  1 packet  10/19/18 10:00  10/19/18 10:40





  Phos-Nak Packet -  PO   1 packet





  BID EVIE   Administration





     





     





     





     


 


Promethazine HCl  12.5 mg  10/15/18 20:20  





  Phenergan Injection -  IVPB   





  Q6H PRN   





  NAUSEA AND/OR VOMITING   





     





     





     


 


Senna/Docusate Sodium  1 tablet  10/18/18 10:00  10/19/18 10:40





  Pericolace -  PO   1 tablet





  BID EVIE   Administration





     





     





     





     











ASSESSMENT/PLAN:


Patient is a 73 year old male with history of thoracic kyphoscoliosis, adjacent 

level disease, segmental instability, spinal stenosis, GERD, anemia. He is POD #

4 s/p L2, L3, L4 laminectomy, T6-T12 facetectomy, L1-L2, L2-L3 SPO's and T6-S1 

PISF.





Thoracic kyphoscoliosis


-POD #4 s/p L2, L3, L4 laminectomy, T6-T12 facetectomy, L1-L2, L2-L3 SPO's and 

T6-S1 PISF.


-Tylenol 650mg PO Q6H PRN pain 1-3


-Oxycodone 5mg PO Q4H PRN pain 4-6


-Oxycodone 10mg PO Q4H PRN pain 7-10


-Incentive spirometry


-Zofran 4mg IV Q4H PRN for nausea


-PT evaluation- patient walked 165 feet


-Neurocheck Q4H 





Constipation


-Miralax 1grams PO BID


-Senna 1 tablet PO BID


-Dulcolax 10mg CA daily PRN





HLD


-Lovaza 2grams PO daily





Hypomagnesemia, Hypophosphatemia


-Magnesium oxide 400mg PO daily


-Naph, Mb-Db/ K 1 packet PO BID





FEN


-No IV fluids. Patient tolerating oral intake. 


-Follow CMP


-Regular diet





Prophylaxis


-SCDs and TEDs b/l lower extremities. Per conversation with mary Barajas 

chemical anticoagulation at this time.





Disposition


-Continue care in medical surgical floor. Patient will likely be discharged to 

rehab, pending placement. 








Visit type





- Emergency Visit


Emergency Visit: Yes


ED Registration Date: 10/15/18


Care time: The patient presented to the Emergency Department on the above date 

and was hospitalized for further evaluation of their emergent condition.





- New Patient


This patient is new to me today: No





- Critical Care


Critical Care patient: Yes


Total Critical Care Time (in minutes): 35


Critical Care Statement: The care of this patient involved high complexity 

decision making to prevent further life threatening deterioration of the patient

's condition and/or to evaluate & treat vital organ system(s) failure or risk 

of failure.





- Discharge Referral


Referred to [facility] No

## 2018-10-19 NOTE — PN
Teaching Attending Note


Name of Resident: Cruz Harmon





ATTENDING PHYSICIAN STATEMENT





I saw and evaluated the patient.


I reviewed the resident's note and discussed the case with the resident.


I agree with the resident's findings and plan as documented with exceptions 

below.








SUBJECTIVE:


Patient seen and examined, passing gas, still no BM, mild abdominal discomfort, 

tolerating diet. voiding well. pain under control currently. 





OBJECTIVE:


 Vital Signs











 Period  Temp  Pulse  Resp  BP Sys/Calloway  Pulse Ox


 


 Last 24 Hr  98.1 F-98.2 F  66-78  16-20  111-144/54-97  








 Intake & Output











 10/16/18 10/17/18 10/18/18 10/19/18





 23:59 23:59 23:59 23:59


 


Intake Total 3240 3558 700 


 


Output Total 2990 2160 200 405


 


Balance 250 1398 500 -405


 


Weight 175 lb 8 oz 177 lb  175 lb








General: sitting in chair in no acute distress


Chest: CTAB, no rales or wheezing


Abdomen:Soft, mild distension, positive bowel sounds, NT


Extremities: no edema


Musculoskeletal: spinal dressing clean





Active Medications





Acetaminophen (Tylenol -)  650 mg PO Q4H PRN


   PRN Reason: PAIN LEVEL 4 - 6


Acetaminophen (Tylenol -)  325 mg PO Q4H PRN


   PRN Reason: PAIN LEVEL 4 - 6


Bisacodyl (Dulcolax Suppository -)  10 mg RC DAILY PRN


   PRN Reason: CONSTIPATION


Diphenhydramine HCl (Benadryl Injection -)  25 mg IVPUSH Q4H PRN


   PRN Reason: Pruritis


Donepezil HCl (Aricept -)  5 mg PO DAILY [facility]


   Last Admin: 10/18/18 09:56 Dose:  5 mg


Magnesium Oxide (Mag-Ox -)  400 mg PO DAILY [facility]


   Last Admin: 10/19/18 10:40 Dose:  400 mg


Omega-3-Acid Ethyl Esters (Lovaza -)  2 gm PO DAILY [facility]


   Last Admin: 10/19/18 10:40 Dose:  2 gm


Ondansetron HCl (Zofran Injection)  4 mg IVPUSH Q4H PRN


   PRN Reason: NAUSEA


   Last Admin: 10/19/18 10:43 Dose:  4 mg


Ondansetron HCl (Zofran Injection)  4 mg IVPUSH Q4H PRN


   PRN Reason: NAUSEA AND/OR VOMITING


Ondansetron HCl (Zofran Injection)  4 mg IVPUSH Q6H PRN


   PRN Reason: NAUSEA


Oxycodone HCl (Roxicodone -)  5 mg PO Q4H PRN


   PRN Reason: PAIN LEVEL 4 - 6


Oxycodone HCl (Roxicodone -)  10 mg PO Q4H PRN


   PRN Reason: PAIN LEVEL 7 - 10


Polyethylene Glycol (Miralax (For Daily Use) -)  17 gm PO BID [facility]


   Last Admin: 10/19/18 10:40 Dose:  17 gm


Potassium Phos/Sodium Phos (Phos-Nak Packet -)  1 packet PO BID [facility]


   Last Admin: 10/19/18 10:40 Dose:  1 packet


Promethazine HCl (Phenergan Injection -)  12.5 mg IVPB Q6H PRN


   PRN Reason: NAUSEA AND/OR VOMITING


Senna/Docusate Sodium (Pericolace -)  1 tablet PO BID [facility]


   Last Admin: 10/19/18 10:40 Dose:  1 tablet





 Laboratory Results - last 24 hr











  10/19/18 10/19/18





  05:30 05:30


 


WBC  8.0 


 


RBC  3.18 L 


 


Hgb  9.7 L 


 


Hct  29.1 L 


 


MCV  91.5 


 


MCH  30.6 


 


MCHC  33.4 


 


RDW  12.7 


 


Plt Count  159  D 


 


MPV  9.2 


 


Absolute Neuts (auto)  6.8 


 


Neutrophils %  84.2 H 


 


Lymphocytes %  4.5 L 


 


Monocytes %  8.9 


 


Eosinophils %  2.2  D 


 


Basophils %  0.2 


 


Nucleated RBC %  0 


 


Sodium   139


 


Potassium   3.7


 


Chloride   102


 


Carbon Dioxide   29


 


Anion Gap   7 L


 


BUN   13


 


Creatinine   0.8


 


Creat Clearance w eGFR   > 60


 


Random Glucose   94


 


Calcium   7.9 L


 


Phosphorus   2.2 L


 


Magnesium   2.1














ASSESSMENT AND PLAN:


73 year old man with a history of kyphoscoliosis, hypertriglyceridemia, 

hypomagnesemia, anemia, GERD who was admitted for spine surgery.





- Thoracic kyphoscoliosis with adjacent level disease and segmental instability 

s/p L2, L3, L4 laminectomies; T6-T12 facetectomies; L1-L2, L2-L3 SPOs; T6-S1 

PISF 10/15


-Acute blood loss anemia compounded by hemodilution


-Hypertriglyceridemia


-Hypomagnesemia


-Anemia


-GERD


-COnstipation





Plan:


Doing well,


Aggressive bowel regimen, add dulcolax


Enema if fails to improve. 


Start PO oxycodone, adivsed patient to use the same over PCA.


D/c PCA in 24 hours.


Drain to be removed today per Dr. Andrews, will monitor. 


PO as tolerated.


Incentive spirometry, ambulation, PT eval. 


Place on aggressive bowel regimen.


Monitor h/h


Replete lytes prn


monitor h/h 


continue lovaza


DVTPPX/dispo per spine surgery. 


Plan discussed with patient in detail, all questions answered.


Agree with transfer out of ICU. 


Dispo d/c to SNF in 24-48 hours if continues to improve, off PCA and no new 

concerns. 


Total critical care time spent in ICU 35 min.

## 2018-10-19 NOTE — PN
Teaching Attending Note


Name of Resident: Josue Cortes





ATTENDING PHYSICIAN STATEMENT





I saw and evaluated the patient.


I reviewed the resident's note and discussed the case with the resident.


I agree with the resident's findings and plan as documented.








SUBJECTIVE:


Patient seen and examined in the ICU.  Pain reasonably controlled on PCA.  


Tolerating liquids. 


No fevers or chills.





OBJECTIVE:





  


 Intake & Output











 10/16/18 10/17/18 10/18/18 10/19/18





 23:59 23:59 23:59 23:59


 


Intake Total 3240 3558 700 


 


Output Total 2990 2160 200 405


 


Balance 250 1398 500 -405


 


Weight 175 lb 8 oz 177 lb  175 lb





 Last Vital Signs











Temp Pulse Resp BP Pulse Ox


 


 98.1 F   74   16   138/97   98 


 


 10/19/18 06:00  10/19/18 06:00  10/19/18 06:00  10/19/18 06:00  10/18/18 09:00








Active Medications





Acetaminophen (Tylenol -)  650 mg PO Q4H PRN


   PRN Reason: PAIN LEVEL 4 - 6


Acetaminophen (Tylenol -)  325 mg PO Q4H PRN


   PRN Reason: PAIN LEVEL 4 - 6


Bisacodyl (Dulcolax Suppository -)  10 mg RC DAILY PRN


   PRN Reason: CONSTIPATION


Diphenhydramine HCl (Benadryl Injection -)  25 mg IVPUSH Q4H PRN


   PRN Reason: Pruritis


Donepezil HCl (Aricept -)  5 mg PO DAILY [facility]


   Last Admin: 10/18/18 09:56 Dose:  5 mg


Hydromorphone HCl (Dilaudid Pca -)  10 mg PCA PCA EVIE; Protocol


   Stop: 10/22/18 20:24


   Last Admin: 10/18/18 20:30 Dose:  10 mg


Magnesium Oxide (Mag-Ox -)  400 mg PO DAILY [facility]


   Last Admin: 10/19/18 10:40 Dose:  400 mg


Omega-3-Acid Ethyl Esters (Lovaza -)  2 gm PO DAILY EVIE


   Last Admin: 10/19/18 10:40 Dose:  2 gm


Ondansetron HCl (Zofran Injection)  4 mg IVPUSH Q4H PRN


   PRN Reason: NAUSEA


   Last Admin: 10/19/18 10:43 Dose:  4 mg


Ondansetron HCl (Zofran Injection)  4 mg IVPUSH Q4H PRN


   PRN Reason: NAUSEA AND/OR VOMITING


Ondansetron HCl (Zofran Injection)  4 mg IVPUSH Q6H PRN


   PRN Reason: NAUSEA


Oxycodone HCl (Roxicodone -)  5 mg PO Q4H PRN


   PRN Reason: PAIN LEVEL 4 - 6


Oxycodone HCl (Roxicodone -)  10 mg PO Q4H PRN


   PRN Reason: PAIN LEVEL 7 - 10


Polyethylene Glycol (Miralax (For Daily Use) -)  17 gm PO BID [facility]


   Last Admin: 10/19/18 10:40 Dose:  17 gm


Potassium Phos/Sodium Phos (Phos-Nak Packet -)  1 packet PO BID [facility]


   Last Admin: 10/19/18 10:40 Dose:  1 packet


Promethazine HCl (Phenergan Injection -)  12.5 mg IVPB Q6H PRN


   PRN Reason: NAUSEA AND/OR VOMITING


Senna/Docusate Sodium (Pericolace -)  1 tablet PO BID [facility]


   Last Admin: 10/19/18 10:40 Dose:  1 tablet











Gen:  NAD in chair


Heart: RRR


Lung: decreased breath sounds at the bases


Abd: soft, nontender


Ext: no edema


Back: (+) intact drain 


  


 Laboratory Results - last 24 hr











  10/19/18 10/19/18





  05:30 05:30


 


WBC  8.0 


 


RBC  3.18 L 


 


Hgb  9.7 L 


 


Hct  29.1 L 


 


MCV  91.5 


 


MCH  30.6 


 


MCHC  33.4 


 


RDW  12.7 


 


Plt Count  159  D 


 


MPV  9.2 


 


Absolute Neuts (auto)  6.8 


 


Neutrophils %  84.2 H 


 


Lymphocytes %  4.5 L 


 


Monocytes %  8.9 


 


Eosinophils %  2.2  D 


 


Basophils %  0.2 


 


Nucleated RBC %  0 


 


Sodium   139


 


Potassium   3.7


 


Chloride   102


 


Carbon Dioxide   29


 


Anion Gap   7 L


 


BUN   13


 


Creatinine   0.8


 


Creat Clearance w eGFR   > 60


 


Random Glucose   94


 


Calcium   7.9 L


 


Phosphorus   2.2 L


 


Magnesium   2.1














ASSESSMENT AND PLAN:


Thoracic Kypho-scoliosis


POD #4 S/P L2, L3, L4 laminectomies/T6-T12 Facetectomies/L1-L2, L2-L3 

osteotomies/T6-S1 PISF








-  pain control, will need to stop PCA 


-  Incentive spirometry


-  Drain can be removed per surgery 


-  Advance diet as tolerated


-  DVT prophylaxis


-  D/C planning 





Dr Schilling

## 2018-10-19 NOTE — PN
Progress Note (short form)





- Note


Progress Note: 





Patient stable.P 67,/69 and Spo2 98% on RA.Patient c/o pain score of 4-5/

10 on Dilaudid PCA and requesting to continue PCA today.Will continue pca today 

and will f/u tomorrow.

## 2018-10-20 LAB
ANION GAP SERPL CALC-SCNC: 8 MMOL/L (ref 8–16)
BUN SERPL-MCNC: 13 MG/DL (ref 7–18)
CALCIUM SERPL-MCNC: 7.6 MG/DL (ref 8.5–10.1)
CHLORIDE SERPL-SCNC: 104 MMOL/L (ref 98–107)
CO2 SERPL-SCNC: 29 MMOL/L (ref 21–32)
CREAT SERPL-MCNC: 0.8 MG/DL (ref 0.55–1.3)
DEPRECATED RDW RBC AUTO: 12.8 % (ref 11.9–15.9)
GLUCOSE SERPL-MCNC: 89 MG/DL (ref 74–106)
HCT VFR BLD CALC: 25.7 % (ref 35.4–49)
HGB BLD-MCNC: 8.6 GM/DL (ref 11.7–16.9)
MAGNESIUM SERPL-MCNC: 2.2 MG/DL (ref 1.8–2.4)
MCH RBC QN AUTO: 30.6 PG (ref 25.7–33.7)
MCHC RBC AUTO-ENTMCNC: 33.5 G/DL (ref 32–35.9)
MCV RBC: 91.2 FL (ref 80–96)
PHOSPHATE SERPL-MCNC: 2.4 MG/DL (ref 2.5–4.9)
PLATELET # BLD AUTO: 221 K/MM3 (ref 134–434)
PMV BLD: 8.1 FL (ref 7.5–11.1)
POTASSIUM SERPLBLD-SCNC: 3.8 MMOL/L (ref 3.5–5.1)
RBC # BLD AUTO: 2.82 M/MM3 (ref 4–5.6)
SODIUM SERPL-SCNC: 141 MMOL/L (ref 136–145)
WBC # BLD AUTO: 6.6 K/MM3 (ref 4–10)

## 2018-10-20 RX ADMIN — POLYETHYLENE GLYCOL 3350 SCH: 17 POWDER, FOR SOLUTION ORAL at 20:50

## 2018-10-20 RX ADMIN — POLYETHYLENE GLYCOL 3350 SCH GRAMS: 17 POWDER, FOR SOLUTION ORAL at 09:33

## 2018-10-20 RX ADMIN — POLYETHYLENE GLYCOL 3350 SCH GRAMS: 17 POWDER, FOR SOLUTION ORAL at 21:14

## 2018-10-20 RX ADMIN — ONDANSETRON PRN MG: 2 INJECTION INTRAMUSCULAR; INTRAVENOUS at 18:02

## 2018-10-20 RX ADMIN — DONEPEZIL HYDROCHLORIDE SCH MG: 5 TABLET, FILM COATED ORAL at 09:35

## 2018-10-20 RX ADMIN — POTASSIUM & SODIUM PHOSPHATES POWDER PACK 280-160-250 MG SCH PACKET: 280-160-250 PACK at 09:34

## 2018-10-20 RX ADMIN — OMEGA-3-ACID ETHYL ESTERS SCH GM: 1 CAPSULE, LIQUID FILLED ORAL at 09:34

## 2018-10-20 RX ADMIN — MAGNESIUM OXIDE TAB 400 MG (241.3 MG ELEMENTAL MG) SCH MG: 400 (241.3 MG) TAB at 09:35

## 2018-10-20 RX ADMIN — ACETAMINOPHEN PRN MG: 325 TABLET ORAL at 05:06

## 2018-10-20 RX ADMIN — ACETAMINOPHEN PRN MG: 325 TABLET ORAL at 10:52

## 2018-10-20 RX ADMIN — DOCUSATE SODIUM,SENNOSIDES SCH TABLET: 50; 8.6 TABLET, FILM COATED ORAL at 09:35

## 2018-10-20 RX ADMIN — POTASSIUM & SODIUM PHOSPHATES POWDER PACK 280-160-250 MG SCH PACKET: 280-160-250 PACK at 21:14

## 2018-10-20 RX ADMIN — DOCUSATE SODIUM,SENNOSIDES SCH TABLET: 50; 8.6 TABLET, FILM COATED ORAL at 21:14

## 2018-10-20 RX ADMIN — POTASSIUM & SODIUM PHOSPHATES POWDER PACK 280-160-250 MG SCH: 280-160-250 PACK at 20:49

## 2018-10-20 NOTE — PN
Teaching Attending Note


Name of Resident: Cruz Harmon





ATTENDING PHYSICIAN STATEMENT


Time of evaluation: 8:50 AM


I saw and evaluated the patient.


I reviewed the resident's note and discussed the case with the resident.


I agree with the resident's findings and plan as documented with exceptions 

below.








SUBJECTIVE:


Patient seen and examined. Pain well controlled, had a BM yesterday,working 

with PT, no new complaints. 





OBJECTIVE:


 Vital Signs











 Period  Temp  Pulse  Resp  BP Sys/Calloway  Pulse Ox


 


 Last 24 Hr  97.8 F-98.6 F  68-75  18-20  118-144/52-94  98








 Intake & Output











 10/17/18 10/18/18 10/19/18 10/20/18





 23:59 23:59 23:59 23:59


 


Intake Total 3558 700 240 


 


Output Total 2160 200 905 


 


Balance 1398 500 -665 


 


Weight 177 lb  175 lb 168 lb 4 oz








General: sitting in chair in no acute distress


Chest; CTAB, no rales or wheezing


Abdomen:soft, unchanged distension, non tender, positive bowel sounds


Extremities: no edema


musculoskeletal: spinal dressing clean, drain removed





Active Medications





Acetaminophen (Tylenol -)  650 mg PO Q4H PRN


   PRN Reason: PAIN LEVEL 4 - 6


   Last Admin: 10/20/18 10:52 Dose:  650 mg


Acetaminophen (Tylenol -)  325 mg PO Q4H PRN


   PRN Reason: PAIN LEVEL 4 - 6


Bisacodyl (Dulcolax Suppository -)  10 mg RC DAILY PRN


   PRN Reason: CONSTIPATION


Diphenhydramine HCl (Benadryl Injection -)  25 mg IVPUSH Q4H PRN


   PRN Reason: Pruritis


Donepezil HCl (Aricept -)  5 mg PO DAILY [facility]


   Last Admin: 10/20/18 09:35 Dose:  5 mg


Magnesium Oxide (Mag-Ox -)  400 mg PO DAILY [facility]


   Last Admin: 10/20/18 09:35 Dose:  400 mg


Omega-3-Acid Ethyl Esters (Lovaza -)  2 gm PO DAILY [facility]


   Last Admin: 10/20/18 09:34 Dose:  2 gm


Ondansetron HCl (Zofran Injection)  4 mg IVPUSH Q6H PRN


   PRN Reason: NAUSEA


Oxycodone HCl (Roxicodone -)  5 mg PO Q4H PRN


   PRN Reason: PAIN LEVEL 4 - 6


   Last Admin: 10/20/18 10:52 Dose:  5 mg


Oxycodone HCl (Roxicodone -)  10 mg PO Q4H PRN


   PRN Reason: PAIN LEVEL 7 - 10


   Last Admin: 10/20/18 05:07 Dose:  10 mg


Polyethylene Glycol (Miralax (For Daily Use) -)  17 gm PO BID [facility]


   Last Admin: 10/20/18 09:33 Dose:  17 grams


Potassium Phos/Sodium Phos (Phos-Nak Packet -)  1 packet PO BID [facility]


   Last Admin: 10/20/18 09:34 Dose:  1 packet


Senna/Docusate Sodium (Pericolace -)  1 tablet PO BID [facility]


   Last Admin: 10/20/18 09:35 Dose:  1 tablet





 Laboratory Results - last 24 hr











  10/20/18 10/20/18





  06:00 06:00


 


WBC  6.6 


 


RBC  2.82 L 


 


Hgb  8.6 L 


 


Hct  25.7 L 


 


MCV  91.2 


 


MCH  30.6 


 


MCHC  33.5 


 


RDW  12.8 


 


Plt Count  221  D 


 


MPV  8.1  D 


 


Sodium   141


 


Potassium   3.8


 


Chloride   104


 


Carbon Dioxide   29


 


Anion Gap   8


 


BUN   13


 


Creatinine   0.8


 


Creat Clearance w eGFR   > 60


 


Random Glucose   89


 


Calcium   7.6 L


 


Phosphorus   2.4 L


 


Magnesium   2.2














ASSESSMENT AND PLAN:


73 year old man with a history of kyphoscoliosis, hypertriglyceridemia, 

hypomagnesemia, anemia, GERD who was admitted for spine surgery.





- Thoracic kyphoscoliosis with adjacent level disease and segmental instability 

s/p L2, L3, L4 laminectomies; T6-T12 facetectomies; L1-L2, L2-L3 SPOs; T6-S1 

PISF 10/15


-Acute blood loss anemia compounded by hemodilution


-Hypertriglyceridemia


-Hypomagnesemia


-Anemia


-GERD


-COnstipation





Plan:


Doing well,


Pain control with oxycodone/tylenol prn.


Aggressive bowel regimen.


Drain removed yesterday, no concerns. 


Off PCA.


PO as tolerated.


Incentive spirometry, ambulation, PT eval. 


h/h stable


Replete lytes prn


continue lovaza


DVTPPX/dispo per spine surgery. 


Working with PT.


Dispo d/c home with services vs SNF in 24 hours with outpatient follow up with 

Dr. Andrews. 


Plan discussed with patient in detail, all questions answered.

## 2018-10-20 NOTE — PN
Physical Exam: 


SUBJECTIVE: Patient seen and examined at bedside this morning. Endorses pain 

that is managed with oral oxycodone and tylenol. Endorses two episodes of 

liquid bowel movements overnight. Tolerating regular diet without abdominal pain

, nausea, vomiting. Urinating clear light yellow urine into espinoza catheter. 

Wound vac removed yesterday. Denies fevers, chills, shortness of breath, chest 

pain, palpitations, abdominal pain, nausea, vomiting. 


Discussed with Dr. Andrews over phone this morning, who states he will see 

patient today in the hospital. 





OBJECTIVE:





 Vital Signs











 Period  Temp  Pulse  Resp  BP Sys/Calloway  Pulse Ox


 


 Last 24 Hr  98.1 F-98.6 F  68-75  18-20  118-144/52-94  98











GENERAL: The patient is awake, alert, and fully oriented, in no acute distress.


HEAD: Normal with no signs of trauma.


EYES: PERRLA, extraocular movements intact without nystagmus, sclera anicteric, 

conjunctiva clear. 


ENT: Oropharynx clear without exudates, moist mucous membranes.


NECK: Trachea midline, full range of motion, supple without lymphadenopathy


LUNGS: Good inspiratory effort and air entry b/l. Breath sounds equal, clear to 

auscultation b/l. No wheezes, no crackles. No accessory muscle use. 


HEART: Regular rate and rhythm, S1, S2 without murmur, rub or gallop.


ABDOMEN: Soft, nontender, nondistended, normoactive bowel sounds. No guarding, 

no rebound tenderness. No hepatosplenomegaly appreciated.


EXTREMITIES: 2+ radial and dorsalis pedis pulses b/l. Warm, well-perfused. No 

lower extremity edema b/l. 


NEUROLOGICAL: Cranial nerves II through XII grossly intact. 2+ biceps, 

brachioradialis, and patellar reflexes b/l. Strength 4/5 b/l upper extremities 

in flexion, extension, abduction, adduction, and lower extremities in hip 

flexion, extension, knee flexion, extension, plantarflexion, dorsiflexion.


PSYCH: Normal mood, normal affect upon my encounter today.


SKIN: Warm, dry. Surgical site along spine bandaged, clean, not bleeding, no 

discharge.








 Laboratory Results - last 24 hr











  10/20/18 10/20/18





  06:00 06:00


 


WBC  6.6 


 


RBC  2.82 L 


 


Hgb  8.6 L 


 


Hct  25.7 L 


 


MCV  91.2 


 


MCH  30.6 


 


MCHC  33.5 


 


RDW  12.8 


 


Plt Count  221  D 


 


MPV  8.1  D 


 


Sodium   141


 


Potassium   3.8


 


Chloride   104


 


Carbon Dioxide   29


 


Anion Gap   8


 


BUN   13


 


Creatinine   0.8


 


Creat Clearance w eGFR   > 60


 


Random Glucose   89


 


Calcium   7.6 L


 


Phosphorus   2.4 L


 


Magnesium   2.2








Active Medications











Generic Name Dose Route Start Last Admin





  Trade Name Freq  PRN Reason Stop Dose Admin


 


Acetaminophen  650 mg  10/19/18 18:37  10/20/18 05:06





  Tylenol -  PO   650 mg





  Q4H PRN   Administration





  PAIN LEVEL 4 - 6   





     





     





     


 


Acetaminophen  325 mg  10/19/18 18:37  





  Tylenol -  PO   





  Q4H PRN   





  PAIN LEVEL 4 - 6   





     





     





     


 


Bisacodyl  10 mg  10/19/18 18:37  





  Dulcolax Suppository -  RC   





  DAILY PRN   





  CONSTIPATION   





     





     





     


 


Diphenhydramine HCl  25 mg  10/19/18 18:37  





  Benadryl Injection -  IVPUSH   





  Q4H PRN   





  Pruritis   





     





     





     


 


Donepezil HCl  5 mg  10/20/18 10:00  10/20/18 09:35





  Aricept -  PO   5 mg





  DAILY EVIE   Administration





     





     





     





     


 


Magnesium Oxide  400 mg  10/20/18 10:00  10/20/18 09:35





  Mag-Ox -  PO   400 mg





  DAILY EVIE   Administration





     





     





     





     


 


Omega-3-Acid Ethyl Esters  2 gm  10/20/18 10:00  10/20/18 09:34





  Lovaza -  PO   2 gm





  DAILY EVIE   Administration





     





     





     





     


 


Ondansetron HCl  4 mg  10/19/18 18:37  





  Zofran Injection  IVPUSH   





  Q6H PRN   





  NAUSEA   





     





     





     


 


Oxycodone HCl  5 mg  10/19/18 18:37  





  Roxicodone -  PO   





  Q4H PRN   





  PAIN LEVEL 4 - 6   





     





     





     


 


Oxycodone HCl  10 mg  10/19/18 18:37  10/20/18 05:07





  Roxicodone -  PO   10 mg





  Q4H PRN   Administration





  PAIN LEVEL 7 - 10   





     





     





     


 


Polyethylene Glycol  17 gm  10/19/18 22:00  10/20/18 09:33





  Miralax (For Daily Use) -  PO   17 grams





  BID EVIE   Administration





     





     





     





     


 


Potassium Phos/Sodium Phos  1 packet  10/19/18 22:00  10/20/18 09:34





  Phos-Nak Packet -  PO   1 packet





  BID EVIE   Administration





     





     





     





     


 


Senna/Docusate Sodium  1 tablet  10/19/18 22:00  10/20/18 09:35





  Pericolace -  PO   1 tablet





  BID EVIE   Administration





     





     





     





     











ASSESSMENT/PLAN:


Patient is a 73 year old male with history of thoracic kyphoscoliosis, adjacent 

level disease, segmental instability, spinal stenosis, GERD, anemia. He is POD #

5 s/p L2, L3, L4 laminectomy, T6-T12 facetectomy, L1-L2, L2-L3 SPO's and T6-S1 

PISF.





Thoracic kyphoscoliosis


-POD #5 s/p L2, L3, L4 laminectomy, T6-T12 facetectomy, L1-L2, L2-L3 SPO's and 

T6-S1 PISF.


-Tylenol 650mg PO Q6H PRN pain 1-3


-Oxycodone 5mg PO Q4H PRN pain 4-6


-Oxycodone 10mg PO Q4H PRN pain 7-10


-Incentive spirometry


-Zofran 4mg IV Q4H PRN for nausea


-PT evaluation- patient walked 165 feet


-Occupational therapy evaluation


-Neurocheck Q4H 





Constipation


-Miralax 1grams PO BID


-Senna 1 tablet PO BID


-Dulcolax 10mg SD daily PRN





HLD


-Lovaza 2grams PO daily





Hypomagnesemia, Hypophosphatemia


-Magnesium oxide 400mg PO daily


-Naph, Mb-Db/ K 1 packet PO BID





FEN


-No IV fluids. Patient tolerating oral intake. 


-Follow CMP


-Regular diet





Prophylaxis


-SCDs and TEDs b/l lower extremities. Per conversation with Dr. Andrews, no 

chemical anticoagulation at this time.





Disposition


-Continue care in medical surgical floor. Patient will likely be discharged to 

rehab, pending placement. 











Visit type





- Emergency Visit


Emergency Visit: No





- New Patient


This patient is new to me today: No





- Critical Care


Critical Care patient: No





- Discharge Referral


Referred to [facility] No

## 2018-10-21 RX ADMIN — ACETAMINOPHEN PRN MG: 325 TABLET ORAL at 22:42

## 2018-10-21 RX ADMIN — MAGNESIUM OXIDE TAB 400 MG (241.3 MG ELEMENTAL MG) SCH MG: 400 (241.3 MG) TAB at 09:21

## 2018-10-21 RX ADMIN — POLYETHYLENE GLYCOL 3350 SCH GRAMS: 17 POWDER, FOR SOLUTION ORAL at 09:21

## 2018-10-21 RX ADMIN — DOCUSATE SODIUM,SENNOSIDES SCH TABLET: 50; 8.6 TABLET, FILM COATED ORAL at 09:20

## 2018-10-21 RX ADMIN — OMEGA-3-ACID ETHYL ESTERS SCH GM: 1 CAPSULE, LIQUID FILLED ORAL at 09:20

## 2018-10-21 RX ADMIN — ACETAMINOPHEN PRN MG: 325 TABLET ORAL at 15:10

## 2018-10-21 RX ADMIN — POLYETHYLENE GLYCOL 3350 SCH GRAMS: 17 POWDER, FOR SOLUTION ORAL at 22:39

## 2018-10-21 RX ADMIN — POTASSIUM & SODIUM PHOSPHATES POWDER PACK 280-160-250 MG SCH PACKET: 280-160-250 PACK at 22:40

## 2018-10-21 RX ADMIN — DOCUSATE SODIUM,SENNOSIDES SCH TABLET: 50; 8.6 TABLET, FILM COATED ORAL at 22:39

## 2018-10-21 RX ADMIN — DONEPEZIL HYDROCHLORIDE SCH MG: 5 TABLET, FILM COATED ORAL at 09:21

## 2018-10-21 RX ADMIN — POTASSIUM & SODIUM PHOSPHATES POWDER PACK 280-160-250 MG SCH PACKET: 280-160-250 PACK at 09:21

## 2018-10-21 NOTE — PN
Progress Note (short form)





- Note


Progress Note: 





pain management





patient stopped on pca overnight, dept of anesthesia will sign off care at this 

time.

## 2018-10-21 NOTE — PN
Physical Exam: 


SUBJECTIVE: Patient seen and examined, abdominal discomfort, no BM yet, pain 

well controlled, no new complaints.








OBJECTIVE:





 Vital Signs











 Period  Temp  Pulse  Resp  BP Sys/Calloway  Pulse Ox


 


 Last 24 Hr  97.4 F-98.8 F  63-82  20-20  119-144/56-86  











GENERAL: The patient is awake, alert, and fully oriented, in no acute distress.


HEAD: Normal with no signs of trauma.


EYES: PERRL, extraocular movements intact, sclera anicteric, conjunctiva clear. 

No ptosis. 


Chest: CTAB, no rales or wheezing


Abdomen;Soft, distended, positive bowel sounds, vague tenderness, no voluntary 

or involuntary guarding or rigidity


Musculoskeletal: spinal dressing clean, drain removed


Extremities: no edema














Active Medications











Generic Name Dose Route Start Last Admin





  Trade Name Freq  PRN Reason Stop Dose Admin


 


Acetaminophen  650 mg  10/19/18 18:37  10/21/18 15:10





  Tylenol -  PO   650 mg





  Q4H PRN   Administration





  PAIN LEVEL 4 - 6   





     





     





     


 


Acetaminophen  325 mg  10/19/18 18:37  10/20/18 21:14





  Tylenol -  PO   325 mg





  Q4H PRN   Administration





  PAIN LEVEL 4 - 6   





     





     





     


 


Bisacodyl  10 mg  10/19/18 18:37  





  Dulcolax Suppository -  RC   





  DAILY PRN   





  CONSTIPATION   





     





     





     


 


Diphenhydramine HCl  25 mg  10/19/18 18:37  





  Benadryl Injection -  IVPUSH   





  Q4H PRN   





  Pruritis   





     





     





     


 


Donepezil HCl  5 mg  10/20/18 10:00  10/21/18 09:21





  Aricept -  PO   5 mg





  DAILY EVIE   Administration





     





     





     





     


 


Magnesium Oxide  400 mg  10/20/18 10:00  10/21/18 09:21





  Mag-Ox -  PO   400 mg





  DAILY EVIE   Administration





     





     





     





     


 


Omega-3-Acid Ethyl Esters  2 gm  10/20/18 10:00  10/21/18 09:20





  Lovaza -  PO   2 gm





  DAILY EVIE   Administration





     





     





     





     


 


Ondansetron HCl  4 mg  10/19/18 18:37  10/20/18 18:02





  Zofran Injection  IVPUSH   4 mg





  Q6H PRN   Administration





  NAUSEA   





     





     





     


 


Oxycodone HCl  5 mg  10/19/18 18:37  10/20/18 19:51





  Roxicodone -  PO   5 mg





  Q4H PRN   Administration





  PAIN LEVEL 4 - 6   





     





     





     


 


Oxycodone HCl  10 mg  10/19/18 18:37  10/21/18 03:23





  Roxicodone -  PO   10 mg





  Q4H PRN   Administration





  PAIN LEVEL 7 - 10   





     





     





     


 


Polyethylene Glycol  17 gm  10/19/18 22:00  10/21/18 09:21





  Miralax (For Daily Use) -  PO   17 grams





  BID EVIE   Administration





     





     





     





     


 


Potassium Phos/Sodium Phos  1 packet  10/19/18 22:00  10/21/18 09:21





  Phos-Nak Packet -  PO   1 packet





  BID EVIE   Administration





     





     





     





     


 


Senna/Docusate Sodium  1 tablet  10/19/18 22:00  10/21/18 09:20





  Pericolace -  PO   1 tablet





  BID EVIE   Administration





     





     





     





     











ASSESSMENT/PLAN:


73 year old man with a history of kyphoscoliosis, hypertriglyceridemia, 

hypomagnesemia, anemia, GERD who was admitted for spine surgery.





- Thoracic kyphoscoliosis with adjacent level disease and segmental instability 

s/p L2, L3, L4 laminectomies; T6-T12 facetectomies; L1-L2, L2-L3 SPOs; T6-S1 

PISF 10/15


-Acute blood loss anemia compounded by hemodilution


-Hypertriglyceridemia


-Hypomagnesemia


-Anemia


-GERD


-Constipation





Plan:


Doing well,


Pain control with oxycodone/tylenol prn.


No BM yet,


Soap sofía enema/lactulose x 1.


Drain removed, no concerns. 


Off PCA.


PO as tolerated.


Incentive spirometry, ambulation, PT eval. 


h/h stable


Replete lytes prn


continue lovaza


DVTPPX per spine surgery. 


Working with PT.


Dispo d/c home with services vs SNF in 24 hours with outpatient follow up with 

Dr. Andrews if no concerns. 


Plan discussed with patient and nursing in detail, all questions answered.





Visit type





- Emergency Visit


Emergency Visit: Yes


ED Registration Date: 10/15/18


Care time: The patient presented to the Emergency Department on the above date 

and was hospitalized for further evaluation of their emergent condition.





- New Patient


This patient is new to me today: No





- Critical Care


Critical Care patient: No





- Discharge Referral


Referred to [facility] No

## 2018-10-22 VITALS — SYSTOLIC BLOOD PRESSURE: 135 MMHG | HEART RATE: 65 BPM | TEMPERATURE: 97.5 F | DIASTOLIC BLOOD PRESSURE: 70 MMHG

## 2018-10-22 LAB
DEPRECATED RDW RBC AUTO: 12.6 % (ref 11.9–15.9)
HCT VFR BLD CALC: 25 % (ref 35.4–49)
HGB BLD-MCNC: 8.3 GM/DL (ref 11.7–16.9)
MCH RBC QN AUTO: 30.4 PG (ref 25.7–33.7)
MCHC RBC AUTO-ENTMCNC: 33.3 G/DL (ref 32–35.9)
MCV RBC: 91.2 FL (ref 80–96)
PLATELET # BLD AUTO: 273 K/MM3 (ref 134–434)
PMV BLD: 7.2 FL (ref 7.5–11.1)
RBC # BLD AUTO: 2.74 M/MM3 (ref 4–5.6)
WBC # BLD AUTO: 7.9 K/MM3 (ref 4–10)

## 2018-10-22 RX ADMIN — OMEGA-3-ACID ETHYL ESTERS SCH GM: 1 CAPSULE, LIQUID FILLED ORAL at 09:01

## 2018-10-22 RX ADMIN — ACETAMINOPHEN PRN MG: 325 TABLET ORAL at 16:25

## 2018-10-22 RX ADMIN — POLYETHYLENE GLYCOL 3350 SCH GRAMS: 17 POWDER, FOR SOLUTION ORAL at 09:03

## 2018-10-22 RX ADMIN — ONDANSETRON PRN MG: 2 INJECTION INTRAMUSCULAR; INTRAVENOUS at 00:05

## 2018-10-22 RX ADMIN — MAGNESIUM OXIDE TAB 400 MG (241.3 MG ELEMENTAL MG) SCH MG: 400 (241.3 MG) TAB at 09:03

## 2018-10-22 RX ADMIN — DONEPEZIL HYDROCHLORIDE SCH MG: 5 TABLET, FILM COATED ORAL at 09:03

## 2018-10-22 RX ADMIN — POTASSIUM & SODIUM PHOSPHATES POWDER PACK 280-160-250 MG SCH PACKET: 280-160-250 PACK at 09:01

## 2018-10-22 RX ADMIN — DOCUSATE SODIUM,SENNOSIDES SCH TABLET: 50; 8.6 TABLET, FILM COATED ORAL at 09:02

## 2018-10-22 RX ADMIN — ACETAMINOPHEN PRN MG: 325 TABLET ORAL at 08:57

## 2018-10-22 NOTE — DS
Physical Exam: 


SUBJECTIVE: Patient seen and examined. Pt. having liquidly stools, tolerating 

PO intake, ambulating with walker. Pt. endorses back pain, and pain in the 

anterior axilla around T4. Pt. denies fever, numbness or tingling of 

extremities. 








OBJECTIVE:





 Vital Signs











 Period  Temp  Pulse  Resp  BP Sys/Calloway  Pulse Ox


 


 Last 24 Hr  97.4 F-99 F  64-93  18-20  129-144/55-76  100








PHYSICAL EXAM





GENERAL: The patient is awake, alert, and fully oriented, and mildly 

uncomfortable.


ENT: Ears normal, nares patent, moist mucous membranes.


LUNGS: Breath sounds equal, clear to auscultation bilaterally, no wheezes, no 

crackles, no accessory muscle use. 


HEART: Regular rate and rhythm, S1, S2 without murmur


ABDOMEN: Soft, nontender, nondistended, normoactive bowel sounds, no guarding, 

no rebound, no hepatosplenomegaly, no masses.


EXTREMITIES: 2+ dorsal pedal pulses, warm, well-perfused,no calf tenderness, no 

edema. 


BACK: Paraspinal tenderness to palpation.


NEUROLOGICAL: Normal speech, gait not observed.


PSYCH: Normal mood, normal affect.


SKIN: Warm, dry, normal turgor.





LABS


 Laboratory Results - last 24 hr











  10/22/18 10/22/18





  06:00 06:00


 


WBC  7.9 


 


RBC  2.74 L 


 


Hgb  8.3 L 


 


Hct  25.0 L 


 


MCV  91.2 


 


MCH  30.4 


 


MCHC  33.3 


 


RDW  12.6 


 


Plt Count  273  D 


 


MPV  7.2 L D 


 


Phosphorus   3.9











HOSPITAL COURSE:





Date of Admission:10/15/18





Date of Discharge: 10/22/18





Pt. admitted for thoracic kyphoscoliosis with adjacent level disease and 

segmental instability s/p L2, L3, L4 laminectomies; T6-T12 facetectomies; L1-L2

, L2-L3 SPOs; T6-S1 PISF 10/15. Pt. tolerated procedure well, Hgb. remained 

stable. Pt. tolerating PO pain medication to good effect. Pt. is able to work 

with Physical therapy and has made progressive improvement with ambulation. Pt. 

has been passing varun and stool and tolerating PO intake for nutrition. Pt.'s 

chronic conditions managed with PO home medications and hospital equivalents. 

Pt. stable for discharge to SNF for ongoing rehabilitation. Hospital course 

discussed with Pt. and agreed upon with medical staff. 





Minutes to complete discharge: 30





Discharge Summary


Reason For Visit: SPINAL STENOSIS LUMBAR REGION


Current Active Problems





Anemia (Acute)


Back pain (Acute)


GERD (gastroesophageal reflux disease) (Acute)


Spinal stenosis (Acute)


Spinal stenosis of lumbar region (Acute)








Condition: Good





- Instructions


Diet, Activity, Other Instructions: 


Keep wound area dry and clean





Activity as tolerated with physical therapy.





Call Dr. Andrews's office on discharge to schedule follow up in 1 week.





Taper narcotics as symptoms improve. Do not take oxycodone if dizzy or drowsy.


Do not drive, operate heavy machinery or take important decisions alone while 

on narcotics.





Ensure to maintain high fiber diet and bowel regimen while on narcotics.





Avoid strenuous activity or heavy lifting more than 5 lbs till cleared by Dr. Andrews.


Do not take your aspirin for now.


Please discuss with Dr. Andrews on next visit when ok to resume Aspirin.





Follow up with Primary care doctor in 1 week of discharge from rehab.





Call 911 or come to ED if any new pain, discharge or bleeding from wound site, 

leg weakness/tingling/numbness or urinary or bowel symptoms or any new concerns.


Referrals: 


Rafa Andrews MD [Staff Physician] - 1 Week


Disposition: SKILLED NURSING FACILITY





- Home Medications


Comprehensive Discharge Medication List: 


Ambulatory Orders





Ascorbic Acid [Vitamin C] 1,000 mg PO DAILY 10/11/18 


Cholecalciferol (Vitamin D3) [Vitamin D3 -] 1,000 unit PO DAILY 10/11/18 


Coconut Oil 1,000 mg PO DAILY 10/11/18 


Donepezil HCl [Aricept] 5 mg PO DAILY 10/11/18 


Icosapent Ethyl [Vascepa] 1 gm PO DAILY 10/11/18 


Magnesium 400 mg PO DAILY 10/11/18 


Acetaminophen [Tylenol .Regular Strength -] 650 mg PO Q4H PRN  tablet 10/22/18 


Bisacodyl Suppository [Dulcolax Suppository -] 10 mg RC DAILY PRN  supp.rect 10/

22/18 


Omega-3 Acid Ethyl Esters [Lovaza -] 2 gm PO DAILY  cap 10/22/18 


Polyethylene Glycol 3350 [Miralax 119 gm Btl -] 17 gm PO BID  bottle 10/22/18 


Sennosides/Docusate Sodium [Pericolace -] 1 tablet PO BID  tablet 10/22/18 


oxyCODONE HCL [Roxicodone -] 10 mg PO Q6H PRN #10 tablet MDD 4 10/22/18 








This patient is new to me today: No


Emergency Visit: No


Critical Care patient: Yes


Total Critical Care Time (in minutes): 32


Critical Care Statement: The care of this patient involved high complexity 

decision making to prevent further life threatening deterioration of the patient

's condition and/or to evaluate & treat vital organ system(s) failure or risk 

of failure.





- Discharge Referral


Referred to [facility] No

## 2018-10-22 NOTE — PN
Progress Note (short form)





- Note


Progress Note: 





73M s/p AJITH L3-S1; L2, L3, L4 laminectomies; T6-T12 facetectomies T6-T12; L1-L2

, L2-L3 SPO's T6-S1 PISF POD #5.





Pain well controlled. No acute events overnight.


Pt. denies overnight history of headaches, chest pain, shortness of breath, 

nausea, vomiting, chills, & sweats.


(+) Voiding; (+) Flatus; (-) BM.


Tolerating diet.


(+) Walked in hallway.





All labs and vitals reviewed.





PE: AAO x 3, NAD.


Spine: Dressing, incision C/D/I. NVI distally B/L LE.





A/P: 73M s/p AJITH L3-S1; L2, L3, L4 laminectomies; T6-T12 facetectomies T6-T12; 

L1-L2, L2-L3 SPO's T6-S1 PISF POD #5.





-Pain control; No NSAID's.


-DVT PPx:


   - Mechanical only: BLANE's, SCD's.


-Incentive spirometry.


-PT/OT/Rehab, OOB.


-WBAT B/L LE.


-q4h B/L LE NV checks.


-Diet as tolerated.


-f/u bowel regimen.


-Care per medical hospitalist team.


-Discharge planning: f/u 7-10 days after discharge at [facility] 

office; call for appointment; [phone].


-Will follow.





Rafa Andrews MD (Orthopaedic Surgery).

## 2018-10-22 NOTE — PN
Teaching Attending Note


Name of Resident: Rafa Rinaldi





ATTENDING PHYSICIAN STATEMENT





I saw and evaluated the patient.


I reviewed the resident's note and discussed the case with the resident.


I agree with the resident's findings and plan as documented with exceptions 

below.








SUBJECTIVE:


Patient seen and examined. Ambulating well, multiple BM yesterday.no 

complaints. 





OBJECTIVE:


 Vital Signs











 Period  Temp  Pulse  Resp  BP Sys/Calloway  Pulse Ox


 


 Last 24 Hr  97.4 F-99 F  64-93  18-20  129-144/55-76  100








 Intake & Output











 10/19/18 10/20/18 10/21/18 10/22/18





 23:59 23:59 23:59 23:59


 


Intake Total 240 700 350 200


 


Output Total 905 200  150


 


Balance -665 500 350 50


 


Weight 175 lb 168 lb 4 oz 170 lb 








General; amulating in room, no acute distress


Musculoskeletal: spinal dressing clean, no active pain or discharge


Chest: CTAB, no rales or wheezing


Abdomen; soft, improved distension, positive bowel sounds, NT


Extremities: no edema





Active Medications





Acetaminophen (Tylenol -)  650 mg PO Q4H PRN


   PRN Reason: PAIN LEVEL 4 - 6


   Last Admin: 10/22/18 08:57 Dose:  650 mg


Acetaminophen (Tylenol -)  325 mg PO Q4H PRN


   PRN Reason: PAIN LEVEL 4 - 6


   Last Admin: 10/20/18 21:14 Dose:  325 mg


Bisacodyl (Dulcolax Suppository -)  10 mg RC DAILY PRN


   PRN Reason: CONSTIPATION


Diphenhydramine HCl (Benadryl Injection -)  25 mg IVPUSH Q4H PRN


   PRN Reason: Pruritis


Donepezil HCl (Aricept -)  5 mg PO DAILY [facility]


   Last Admin: 10/22/18 09:03 Dose:  5 mg


Magnesium Oxide (Mag-Ox -)  400 mg PO DAILY [facility]


   Last Admin: 10/22/18 09:03 Dose:  400 mg


Omega-3-Acid Ethyl Esters (Lovaza -)  2 gm PO DAILY [facility]


   Last Admin: 10/22/18 09:01 Dose:  2 gm


Ondansetron HCl (Zofran Injection)  4 mg IVPUSH Q6H PRN


   PRN Reason: NAUSEA


   Last Admin: 10/22/18 00:05 Dose:  4 mg


Oxycodone HCl (Roxicodone -)  5 mg PO Q4H PRN


   PRN Reason: PAIN LEVEL 4 - 6


   Last Admin: 10/20/18 19:51 Dose:  5 mg


Oxycodone HCl (Roxicodone -)  10 mg PO Q4H PRN


   PRN Reason: PAIN LEVEL 7 - 10


   Last Admin: 10/22/18 08:55 Dose:  10 mg


Polyethylene Glycol (Miralax (For Daily Use) -)  17 gm PO BID [facility]


   Last Admin: 10/22/18 09:03 Dose:  17 grams


Potassium Phos/Sodium Phos (Phos-Nak Packet -)  2 packet PO BID [facility]


   Stop: 10/22/18 10:01


   Last Admin: 10/22/18 09:01 Dose:  2 packet


Senna/Docusate Sodium (Pericolace -)  1 tablet PO BID [facility]


   Last Admin: 10/22/18 09:02 Dose:  1 tablet





 Laboratory Results - last 24 hr











  10/22/18 10/22/18





  06:00 06:00


 


WBC  7.9 


 


RBC  2.74 L 


 


Hgb  8.3 L 


 


Hct  25.0 L 


 


MCV  91.2 


 


MCH  30.4 


 


MCHC  33.3 


 


RDW  12.6 


 


Plt Count  273  D 


 


MPV  7.2 L D 


 


Phosphorus   3.9














ASSESSMENT AND PLAN:


73 year old man with a history of kyphoscoliosis, hypertriglyceridemia, 

hypomagnesemia, anemia, GERD who was admitted for spine surgery.





- Thoracic kyphoscoliosis with adjacent level disease and segmental instability 

s/p L2, L3, L4 laminectomies; T6-T12 facetectomies; L1-L2, L2-L3 SPOs; T6-S1 

PISF 10/15


-Acute blood loss anemia compounded by hemodilution


-Hypertriglyceridemia


-Hypomagnesemia


-Anemia


-GERD


-Constipation





Plan:


Doing well,


Pain control with oxycodone/tylenol prn.


Constipation resolved.


Drain removed, no concerns. 


Off PCA.


PO as tolerated.


Incentive spirometry, ambulation, PT eval. 


h/h stable


Replete lytes prn


continue lovaza


DVTPPX per spine surgery. 


Working with PT.


Dispo bed available at [place], discussed with patient, agreable to SNF.


Discussed with Social work


d/c to [facility] today when disposition arranged.

## 2023-10-12 PROBLEM — Z00.00 ENCOUNTER FOR PREVENTIVE HEALTH EXAMINATION: Status: ACTIVE | Noted: 2023-10-12

## 2023-10-18 ENCOUNTER — APPOINTMENT (OUTPATIENT)
Dept: ORTHOPEDIC SURGERY | Facility: CLINIC | Age: 78
End: 2023-10-18
Payer: MEDICARE

## 2023-10-18 VITALS
HEIGHT: 68 IN | WEIGHT: 165 LBS | BODY MASS INDEX: 25.01 KG/M2 | OXYGEN SATURATION: 97 % | SYSTOLIC BLOOD PRESSURE: 153 MMHG | HEART RATE: 63 BPM | DIASTOLIC BLOOD PRESSURE: 67 MMHG

## 2023-10-18 DIAGNOSIS — M65.30 TRIGGER FINGER, UNSPECIFIED FINGER: ICD-10-CM

## 2023-10-18 PROCEDURE — 99204 OFFICE O/P NEW MOD 45 MIN: CPT | Mod: 25

## 2023-10-18 PROCEDURE — 20550 NJX 1 TENDON SHEATH/LIGAMENT: CPT | Mod: LT

## 2023-10-29 ENCOUNTER — NON-APPOINTMENT (OUTPATIENT)
Age: 78
End: 2023-10-29

## 2023-11-01 ENCOUNTER — APPOINTMENT (OUTPATIENT)
Dept: ORTHOPEDIC SURGERY | Facility: CLINIC | Age: 78
End: 2023-11-01
Payer: MEDICARE

## 2023-11-01 DIAGNOSIS — M65.341 TRIGGER FINGER, RIGHT RING FINGER: ICD-10-CM

## 2023-11-01 DIAGNOSIS — M72.0 PALMAR FASCIAL FIBROMATOSIS [DUPUYTREN]: ICD-10-CM

## 2023-11-01 PROCEDURE — 99213 OFFICE O/P EST LOW 20 MIN: CPT | Mod: 25

## 2023-11-29 ENCOUNTER — APPOINTMENT (OUTPATIENT)
Dept: ORTHOPEDIC SURGERY | Facility: CLINIC | Age: 78
End: 2023-11-29
Payer: MEDICARE

## 2023-11-29 DIAGNOSIS — M65.321 TRIGGER FINGER, RIGHT INDEX FINGER: ICD-10-CM

## 2023-11-29 DIAGNOSIS — M65.331 TRIGGER FINGER, RIGHT MIDDLE FINGER: ICD-10-CM

## 2023-11-29 PROCEDURE — 99214 OFFICE O/P EST MOD 30 MIN: CPT | Mod: 25
